# Patient Record
Sex: FEMALE | Race: WHITE | NOT HISPANIC OR LATINO
[De-identification: names, ages, dates, MRNs, and addresses within clinical notes are randomized per-mention and may not be internally consistent; named-entity substitution may affect disease eponyms.]

---

## 2017-08-02 ENCOUNTER — APPOINTMENT (OUTPATIENT)
Dept: DERMATOLOGY | Facility: CLINIC | Age: 77
End: 2017-08-02
Payer: MEDICARE

## 2017-08-02 DIAGNOSIS — Z41.1 ENCOUNTER FOR COSMETIC SURGERY: ICD-10-CM

## 2017-08-02 DIAGNOSIS — H26.9 UNSPECIFIED CATARACT: ICD-10-CM

## 2017-08-02 PROCEDURE — 99213 OFFICE O/P EST LOW 20 MIN: CPT

## 2017-08-02 PROCEDURE — D0125: CPT

## 2018-02-07 ENCOUNTER — APPOINTMENT (OUTPATIENT)
Dept: DERMATOLOGY | Facility: CLINIC | Age: 78
End: 2018-02-07
Payer: MEDICARE

## 2018-02-07 PROCEDURE — 99213 OFFICE O/P EST LOW 20 MIN: CPT

## 2018-05-02 ENCOUNTER — OUTPATIENT (OUTPATIENT)
Dept: OUTPATIENT SERVICES | Facility: HOSPITAL | Age: 78
LOS: 1 days | End: 2018-05-02
Payer: MEDICARE

## 2018-05-02 DIAGNOSIS — H26.492 OTHER SECONDARY CATARACT, LEFT EYE: ICD-10-CM

## 2018-05-02 DIAGNOSIS — Z86.69 PERSONAL HISTORY OF OTHER DISEASES OF THE NERVOUS SYSTEM AND SENSE ORGANS: Chronic | ICD-10-CM

## 2018-05-02 PROCEDURE — 66821 AFTER CATARACT LASER SURGERY: CPT | Mod: LT

## 2018-08-13 ENCOUNTER — APPOINTMENT (OUTPATIENT)
Dept: DERMATOLOGY | Facility: CLINIC | Age: 78
End: 2018-08-13
Payer: MEDICARE

## 2018-08-13 DIAGNOSIS — Z87.09 PERSONAL HISTORY OF OTHER DISEASES OF THE RESPIRATORY SYSTEM: ICD-10-CM

## 2018-08-13 PROCEDURE — 17000 DESTRUCT PREMALG LESION: CPT

## 2018-08-13 PROCEDURE — 99213 OFFICE O/P EST LOW 20 MIN: CPT | Mod: 25

## 2018-08-13 RX ORDER — AMOXICILLIN 875 MG/1
875 TABLET, FILM COATED ORAL
Qty: 20 | Refills: 0 | Status: DISCONTINUED | COMMUNITY
Start: 2018-05-21

## 2018-08-13 RX ORDER — MONTELUKAST 10 MG/1
10 TABLET, FILM COATED ORAL
Qty: 90 | Refills: 0 | Status: COMPLETED | COMMUNITY
Start: 2018-06-28

## 2018-08-13 RX ORDER — FLUTICASONE PROPIONATE 50 UG/1
50 SPRAY, METERED NASAL
Qty: 16 | Refills: 0 | Status: COMPLETED | COMMUNITY
Start: 2018-07-26

## 2018-08-13 RX ORDER — AZELASTINE HYDROCHLORIDE 137 UG/1
0.1 SPRAY, METERED NASAL
Qty: 30 | Refills: 0 | Status: COMPLETED | COMMUNITY
Start: 2018-07-26

## 2018-08-13 RX ORDER — CEFADROXIL 500 MG/1
500 CAPSULE ORAL
Qty: 20 | Refills: 0 | Status: COMPLETED | COMMUNITY
Start: 2018-05-03

## 2019-02-06 ENCOUNTER — APPOINTMENT (OUTPATIENT)
Dept: DERMATOLOGY | Facility: CLINIC | Age: 79
End: 2019-02-06
Payer: MEDICARE

## 2019-02-06 VITALS — WEIGHT: 200 LBS | HEIGHT: 68 IN | BODY MASS INDEX: 30.31 KG/M2

## 2019-02-06 PROCEDURE — 99213 OFFICE O/P EST LOW 20 MIN: CPT

## 2019-02-06 NOTE — HISTORY OF PRESENT ILLNESS
[de-identified] : Pt. presents for skin check;\par arms did well with 5FU\par Severity:  mild  \par Modifying factors:  none\par Associated symptoms:  none\par Context:  no association with activity

## 2019-02-06 NOTE — PHYSICAL EXAM
[Full Body Skin Exam Performed] : performed [FreeTextEntry3] : Skin examination performed of the face, neck, trunk, arms, legs; \par The patient is well, alert and oriented, pleasant and cooperative.\par Eyelids, conjunctivae, oral mucosa, digits and nails all normal.  \par No cervical adenopathy.\par \par Normal findings include:\par \par Seborrheic keratoses\par Angiomas\par Lentigines\par scaling erythematous papules; one prominent lesion L preauricular;  \par + DSAP; UEs, LEs; \par \par No lesions were suspicious for malignancy. \par \par

## 2019-02-06 NOTE — ASSESSMENT
[FreeTextEntry1] : Complete skin examination is negative for malignancy;\par Renew 5FU to spot tx for DSAP, may treat on ongoing basis;  arms did well\par Continue regular exams;

## 2019-06-03 ENCOUNTER — OUTPATIENT (OUTPATIENT)
Dept: OUTPATIENT SERVICES | Facility: HOSPITAL | Age: 79
LOS: 1 days | End: 2019-06-03
Payer: MEDICARE

## 2019-06-03 DIAGNOSIS — H40.1112 PRIMARY OPEN-ANGLE GLAUCOMA, RIGHT EYE, MODERATE STAGE: ICD-10-CM

## 2019-06-03 DIAGNOSIS — Z86.69 PERSONAL HISTORY OF OTHER DISEASES OF THE NERVOUS SYSTEM AND SENSE ORGANS: Chronic | ICD-10-CM

## 2019-06-03 PROCEDURE — 65855 TRABECULOPLASTY LASER SURG: CPT | Mod: RT

## 2019-06-26 ENCOUNTER — OUTPATIENT (OUTPATIENT)
Dept: OUTPATIENT SERVICES | Facility: HOSPITAL | Age: 79
LOS: 1 days | End: 2019-06-26
Payer: MEDICARE

## 2019-06-26 DIAGNOSIS — Z86.69 PERSONAL HISTORY OF OTHER DISEASES OF THE NERVOUS SYSTEM AND SENSE ORGANS: Chronic | ICD-10-CM

## 2019-06-26 DIAGNOSIS — H40.1190 PRIMARY OPEN-ANGLE GLAUCOMA, UNSPECIFIED EYE, STAGE UNSPECIFIED: ICD-10-CM

## 2019-06-26 PROCEDURE — 65855 TRABECULOPLASTY LASER SURG: CPT | Mod: LT

## 2019-08-07 ENCOUNTER — APPOINTMENT (OUTPATIENT)
Dept: DERMATOLOGY | Facility: CLINIC | Age: 79
End: 2019-08-07
Payer: MEDICARE

## 2019-08-07 DIAGNOSIS — Z85.828 PERSONAL HISTORY OF OTHER MALIGNANT NEOPLASM OF SKIN: ICD-10-CM

## 2019-08-07 PROCEDURE — 99213 OFFICE O/P EST LOW 20 MIN: CPT | Mod: 25

## 2019-08-07 PROCEDURE — 11103 TANGNTL BX SKIN EA SEP/ADDL: CPT

## 2019-08-07 PROCEDURE — 11102 TANGNTL BX SKIN SINGLE LES: CPT

## 2019-08-07 NOTE — ASSESSMENT
[FreeTextEntry1] : The patient was instructed to check portal and/or call the office in one week for biopsy results. \par Plan to treat by D&C if the biopsy is positive. \par Continue regular exams;

## 2019-08-07 NOTE — HISTORY OF PRESENT ILLNESS
[de-identified] : Pt. presents for skin check;\par one new spot on R side of nose\par Severity:  mild  \par Modifying factors:  none\par Associated symptoms:  none\par Context:  no association with activity

## 2019-08-07 NOTE — PHYSICAL EXAM
[Full Body Skin Exam Performed] : performed [FreeTextEntry3] : Skin examination performed of the face, neck, trunk, arms, legs; \par The patient is well, alert and oriented, pleasant and cooperative.\par Eyelids, conjunctivae, oral mucosa, digits and nails all normal.  \par No cervical adenopathy.\par \par Normal findings include:\par \par Seborrheic keratoses\par Angiomas\par Lentigines\par + DSAP; UEs, LEs; \par \par pearly papule with telangiectasiae - R nose ala; small;  \par also; slightly larger papule, L ala groove\par \par

## 2019-08-16 LAB — CORE LAB BIOPSY: NORMAL

## 2019-08-20 ENCOUNTER — APPOINTMENT (OUTPATIENT)
Dept: DERMATOLOGY | Facility: CLINIC | Age: 79
End: 2019-08-20
Payer: MEDICARE

## 2019-08-20 ENCOUNTER — APPOINTMENT (OUTPATIENT)
Dept: DERMATOLOGY | Facility: CLINIC | Age: 79
End: 2019-08-20

## 2019-08-20 PROCEDURE — 17282 DSTR MAL LS F/E/E/N/L/M1.1-2: CPT

## 2020-02-05 ENCOUNTER — APPOINTMENT (OUTPATIENT)
Dept: DERMATOLOGY | Facility: CLINIC | Age: 80
End: 2020-02-05
Payer: MEDICARE

## 2020-02-05 PROCEDURE — 11102 TANGNTL BX SKIN SINGLE LES: CPT

## 2020-02-05 PROCEDURE — 99213 OFFICE O/P EST LOW 20 MIN: CPT | Mod: 25

## 2020-02-05 NOTE — HISTORY OF PRESENT ILLNESS
[de-identified] : Pt. presents for skin check;\par recent BCC L nose; \par Severity:  mild  \par Modifying factors:  none\par Associated symptoms:  none\par Context:  no association with activity

## 2020-02-05 NOTE — ASSESSMENT
[FreeTextEntry1] : The patient was instructed to check portal and/or call the office in one week for biopsy results. \par Plan to treat by D&C if the biopsy is positive. r/o BCC near L earlobe\par Continue regular exams; \par

## 2020-02-05 NOTE — PHYSICAL EXAM
[Full Body Skin Exam Performed] : performed [FreeTextEntry3] : Skin examination performed of the face, neck, trunk, arms, legs; \par The patient is well, alert and oriented, pleasant and cooperative.\par Eyelids, conjunctivae, oral mucosa, digits and nails all normal.  \par No cervical adenopathy.\par \par Normal findings include:\par \par Seborrheic keratoses\par Angiomas\par Lentigines\par + DSAP; UEs, LEs; \par \par healed scar;  L ala groove\par \par pearly papule with telangiectasiae Left preauricular near earlobe;\par \par

## 2020-02-25 LAB — CORE LAB BIOPSY: NORMAL

## 2020-03-10 ENCOUNTER — APPOINTMENT (OUTPATIENT)
Dept: DERMATOLOGY | Facility: CLINIC | Age: 80
End: 2020-03-10
Payer: MEDICARE

## 2020-03-10 VITALS — HEIGHT: 68 IN | WEIGHT: 220 LBS | BODY MASS INDEX: 33.34 KG/M2

## 2020-03-10 PROCEDURE — 17282 DSTR MAL LS F/E/E/N/L/M1.1-2: CPT

## 2020-03-10 RX ORDER — LOVASTATIN 10 MG/1
10 TABLET ORAL
Refills: 0 | Status: DISCONTINUED | COMMUNITY
End: 2020-03-10

## 2020-08-05 ENCOUNTER — APPOINTMENT (OUTPATIENT)
Dept: DERMATOLOGY | Facility: CLINIC | Age: 80
End: 2020-08-05
Payer: MEDICARE

## 2020-08-05 VITALS — WEIGHT: 200 LBS | BODY MASS INDEX: 30.41 KG/M2

## 2020-08-05 PROCEDURE — 17000 DESTRUCT PREMALG LESION: CPT

## 2020-08-05 PROCEDURE — 99213 OFFICE O/P EST LOW 20 MIN: CPT | Mod: 25

## 2020-08-05 NOTE — HISTORY OF PRESENT ILLNESS
[de-identified] : Pt. presents for skin check;\par One recent inflamed lesion on R hand\par Severity:  mild  \par Modifying factors:  none\par Associated symptoms:  none\par Context:  no association with activity \par Recent BCCs on L nose, L preauricular

## 2020-08-05 NOTE — PHYSICAL EXAM
[Full Body Skin Exam Performed] : performed [FreeTextEntry3] : Skin examination performed of the face, neck, trunk, arms, legs; \par The patient is well, alert and oriented, pleasant and cooperative.\par Eyelids, conjunctivae, oral mucosa, digits and nails all normal.  \par No cervical adenopathy.\par \par Normal findings include:\par \par Seborrheic keratoses\par Angiomas\par Lentigines\par + DSAP; UEs, LEs; \par \par healed scar;  L ala groove, Left preauricular near earlobe;\par \par scaling erythematous papule; Right dorsal hand\par \par

## 2020-08-05 NOTE — ASSESSMENT
[FreeTextEntry1] : LN2 to AK R hand; \par recent BCCs healed well; \par Continue regular exams;  Follow up for TBSE in 6 months \par

## 2020-09-11 ENCOUNTER — APPOINTMENT (OUTPATIENT)
Dept: DERMATOLOGY | Facility: CLINIC | Age: 80
End: 2020-09-11
Payer: MEDICARE

## 2020-09-11 PROCEDURE — 99213 OFFICE O/P EST LOW 20 MIN: CPT | Mod: 25

## 2020-09-11 PROCEDURE — 11102 TANGNTL BX SKIN SINGLE LES: CPT

## 2020-09-11 PROCEDURE — 11103 TANGNTL BX SKIN EA SEP/ADDL: CPT

## 2020-09-11 NOTE — PHYSICAL EXAM
[FreeTextEntry3] : Skin examination performed of the face, neck, chest, hands, lower legs;\par The patient is well, alert and oriented, pleasant and cooperative.\par Eyelids, conjunctivae, oral mucosa, digits and nails all normal.  \par No cervical adenopathy.\par \par \par + lentigines and solar damage are present in sun exposed areas; \par \par pearly papule with telangiectasiae - inflamed;  L chin; \par similar, smaller lesion R paranasal

## 2020-09-11 NOTE — ASSESSMENT
[FreeTextEntry1] : The patient was instructed to check portal and/or call the office in one week for biopsy results.\par Plan to treat by D&C if the biopsy is positive. r/o BCC vs. inflammatory L chin, R paranasal\par \par also:  renew 5FU for DSAP on arms

## 2020-09-11 NOTE — HISTORY OF PRESENT ILLNESS
[de-identified] : The patient has been fit in for urgent appointment. \par c/o lesion on L chin;  grew, changed color over last few weeks; \par also; ? growing lesion near R nose

## 2020-09-30 LAB — CORE LAB BIOPSY: NORMAL

## 2020-10-13 ENCOUNTER — APPOINTMENT (OUTPATIENT)
Dept: DERMATOLOGY | Facility: CLINIC | Age: 80
End: 2020-10-13
Payer: MEDICARE

## 2020-10-13 VITALS — WEIGHT: 200 LBS | BODY MASS INDEX: 30.31 KG/M2 | HEIGHT: 68 IN

## 2020-10-13 PROCEDURE — 17282 DSTR MAL LS F/E/E/N/L/M1.1-2: CPT | Mod: 59

## 2020-12-02 ENCOUNTER — APPOINTMENT (OUTPATIENT)
Dept: DERMATOLOGY | Facility: CLINIC | Age: 80
End: 2020-12-02
Payer: MEDICARE

## 2020-12-02 PROCEDURE — 99213 OFFICE O/P EST LOW 20 MIN: CPT | Mod: 25

## 2020-12-02 PROCEDURE — 11102 TANGNTL BX SKIN SINGLE LES: CPT

## 2020-12-02 NOTE — ASSESSMENT
[FreeTextEntry1] : r/o SCC\par The patient was instructed to check portal and/or call the office in one week for biopsy results. \par Plan to treat by D&C if the biopsy is positive. \par \par

## 2020-12-02 NOTE — HISTORY OF PRESENT ILLNESS
[de-identified] : The patient has been fit in for urgent appointment.\par c/o irritated, inflamed, bleeding lesion on R lower leg;  \par noticed last few days;  no txs;

## 2020-12-11 LAB — CORE LAB BIOPSY: NORMAL

## 2021-02-03 ENCOUNTER — APPOINTMENT (OUTPATIENT)
Dept: DERMATOLOGY | Facility: CLINIC | Age: 81
End: 2021-02-03
Payer: MEDICARE

## 2021-02-03 PROCEDURE — 11102 TANGNTL BX SKIN SINGLE LES: CPT

## 2021-02-03 PROCEDURE — 99213 OFFICE O/P EST LOW 20 MIN: CPT | Mod: 25

## 2021-02-03 NOTE — ASSESSMENT
[FreeTextEntry1] : The patient was instructed to check portal and/or call the office in one week for biopsy results.\par Plan to refer for Mohs surgery if the biopsy is positive. Lesion near prior surgical site\par \par Continue regular exams;  Follow up for TBSE in 6 months \par also:  fluticasone ointment prn for intertrigo;  vaseline maintenance

## 2021-02-03 NOTE — PHYSICAL EXAM
[Full Body Skin Exam Performed] : performed [FreeTextEntry3] : Skin examination performed of the face, neck, trunk, arms, legs; \par The patient is well, alert and oriented, pleasant and cooperative.\par Eyelids, conjunctivae, oral mucosa, digits and nails all normal.  \par No cervical adenopathy.\par \par Normal findings include:\par \par Seborrheic keratoses\par Angiomas\par Lentigines\par + DSAP; UEs, LEs; \par \par healed scar; L chin, R paranasal\par \par pearly papule with telangiectasiae L paranasal near prior Mohs scar\par healed Bx site R shin\par \par

## 2021-02-03 NOTE — HISTORY OF PRESENT ILLNESS
[de-identified] : Pt. presents for skin check;\par One recent biopsy r lower leg\par Severity:  mild  \par Modifying factors:  none\par Associated symptoms:  none\par Context:  no association with activity \par Recent BCCs on R chin, R paranasal

## 2021-02-19 LAB — CORE LAB BIOPSY: NORMAL

## 2021-02-24 NOTE — PHYSICAL EXAM
[FreeTextEntry3] : R lower leg;  eroded inflamed papules;  amongst background of widespread DSAP\par 
denies

## 2021-03-01 ENCOUNTER — APPOINTMENT (OUTPATIENT)
Dept: DISASTER EMERGENCY | Facility: CLINIC | Age: 81
End: 2021-03-01

## 2021-03-01 DIAGNOSIS — Z01.818 ENCOUNTER FOR OTHER PREPROCEDURAL EXAMINATION: ICD-10-CM

## 2021-03-02 LAB — SARS-COV-2 N GENE NPH QL NAA+PROBE: NOT DETECTED

## 2021-03-04 ENCOUNTER — NON-APPOINTMENT (OUTPATIENT)
Age: 81
End: 2021-03-04

## 2021-03-04 ENCOUNTER — APPOINTMENT (OUTPATIENT)
Dept: DERMATOLOGY | Facility: CLINIC | Age: 81
End: 2021-03-04
Payer: MEDICARE

## 2021-03-04 PROCEDURE — 13132 CMPLX RPR F/C/C/M/N/AX/G/H/F: CPT

## 2021-03-04 PROCEDURE — 17311 MOHS 1 STAGE H/N/HF/G: CPT

## 2021-03-04 PROCEDURE — 17312 MOHS ADDL STAGE: CPT

## 2021-06-28 ENCOUNTER — RX RENEWAL (OUTPATIENT)
Age: 81
End: 2021-06-28

## 2021-07-22 PROBLEM — Z72.89 ALCOHOL USE: Status: ACTIVE | Noted: 2021-07-22

## 2021-07-22 PROBLEM — Z80.6 FAMILY HISTORY OF CHRONIC MYELOID LEUKEMIA: Status: ACTIVE | Noted: 2021-07-22

## 2021-07-22 PROBLEM — R55 VASOVAGAL SYNCOPE: Status: RESOLVED | Noted: 2021-07-22 | Resolved: 2021-07-22

## 2021-07-22 PROBLEM — Z80.8 FAMILY HISTORY OF MALIGNANT MELANOMA: Status: ACTIVE | Noted: 2021-07-22

## 2021-07-22 PROBLEM — Z82.49 FAMILY HISTORY OF CORONARY ARTERY DISEASE: Status: ACTIVE | Noted: 2021-07-22

## 2021-07-22 PROBLEM — Z82.49 FAMILY HISTORY OF MYOCARDIAL INFARCTION: Status: ACTIVE | Noted: 2021-07-22

## 2021-07-22 PROBLEM — Z82.49 FAMILY HISTORY OF CONGESTIVE HEART FAILURE: Status: ACTIVE | Noted: 2021-07-22

## 2021-07-22 PROBLEM — Z82.49 FAMILY HISTORY OF HYPERTENSION: Status: ACTIVE | Noted: 2021-07-22

## 2021-07-22 PROBLEM — Z78.9 EXERCISES OCCASIONALLY: Status: ACTIVE | Noted: 2021-07-22

## 2021-07-22 PROBLEM — Z63.4 WIDOWED: Status: ACTIVE | Noted: 2021-07-22

## 2021-07-22 RX ORDER — ATENOLOL 100 MG/1
100 TABLET ORAL
Refills: 0 | Status: DISCONTINUED | COMMUNITY
End: 2021-07-22

## 2021-07-23 ENCOUNTER — MED ADMIN CHARGE (OUTPATIENT)
Age: 81
End: 2021-07-23

## 2021-07-23 ENCOUNTER — APPOINTMENT (OUTPATIENT)
Dept: FAMILY MEDICINE | Facility: CLINIC | Age: 81
End: 2021-07-23
Payer: MEDICARE

## 2021-07-23 VITALS
WEIGHT: 225 LBS | BODY MASS INDEX: 34.1 KG/M2 | OXYGEN SATURATION: 98 % | HEART RATE: 50 BPM | DIASTOLIC BLOOD PRESSURE: 88 MMHG | HEIGHT: 68 IN | TEMPERATURE: 97 F | SYSTOLIC BLOOD PRESSURE: 130 MMHG

## 2021-07-23 DIAGNOSIS — Z80.8 FAMILY HISTORY OF MALIGNANT NEOPLASM OF OTHER ORGANS OR SYSTEMS: ICD-10-CM

## 2021-07-23 DIAGNOSIS — Z23 ENCOUNTER FOR IMMUNIZATION: ICD-10-CM

## 2021-07-23 DIAGNOSIS — Z82.49 FAMILY HISTORY OF ISCHEMIC HEART DISEASE AND OTHER DISEASES OF THE CIRCULATORY SYSTEM: ICD-10-CM

## 2021-07-23 DIAGNOSIS — R55 SYNCOPE AND COLLAPSE: ICD-10-CM

## 2021-07-23 DIAGNOSIS — Z78.9 OTHER SPECIFIED HEALTH STATUS: ICD-10-CM

## 2021-07-23 DIAGNOSIS — Z72.89 OTHER PROBLEMS RELATED TO LIFESTYLE: ICD-10-CM

## 2021-07-23 DIAGNOSIS — Z80.6 FAMILY HISTORY OF LEUKEMIA: ICD-10-CM

## 2021-07-23 DIAGNOSIS — Z63.4 DISAPPEARANCE AND DEATH OF FAMILY MEMBER: ICD-10-CM

## 2021-07-23 PROCEDURE — 99214 OFFICE O/P EST MOD 30 MIN: CPT | Mod: 25

## 2021-07-23 PROCEDURE — 90732 PPSV23 VACC 2 YRS+ SUBQ/IM: CPT

## 2021-07-23 PROCEDURE — G0009: CPT

## 2021-07-23 PROCEDURE — 36415 COLL VENOUS BLD VENIPUNCTURE: CPT

## 2021-07-23 SDOH — SOCIAL STABILITY - SOCIAL INSECURITY: DISSAPEARANCE AND DEATH OF FAMILY MEMBER: Z63.4

## 2021-07-23 NOTE — PLAN
[FreeTextEntry1] : Discussed clean eating (eg Mediterranean style eating plan) and regular exercise/staying as physically active as possible. Revd concepts of motion is lotion and move more, sit less. \par \par Reviewed importance of good self care (eg meditation, yoga, adequate rest, regular exercise, magnesium, clean eating etc) to help with anxiety/stress and also to optimize BPs. Revd r/b/se Alprazolam and that it is not recommended generally at this stage of life but as med is used infreq and still well tolerated and effective for her ok for her to cont to keep on hand for now. She will d/c med if at any point she has any SE and she will cont to use only sparingly. \par \par Check labs today (partially fasting as had cheerios and banana and milk). Cont same meds/doses pending doses. \par \par Pneumovax revd/recommended. Given today to complete pneumococcal series. \par \par Her BP is mildly elevated above goal here today and she notes that she has had feelings of elevated BP at home recently (feeling agitated/anxious etc). Disc option to increase or add BP med and she is willing to do so. Given that her HR is already around 50 on current dose of Atenolol we really can not safely increase that med. Thus will Add amlodipine 2.5 mg daily. She can monitor BPs at home with goal of <=130/80 on avg and if she finds BPs are symptomatically low she can skip/hold/d/c amlodipine and if home BPs are still above goal with the 2.5 mg dose of amlodipine she can let me know and we can incr dose. \par \par Last PE was 1/2020 so will sched next visit as a PE visit.

## 2021-07-23 NOTE — HISTORY OF PRESENT ILLNESS
[FreeTextEntry1] : ALONDRA LOUIS is a 81 year old female here for a follow up visit.\par  [de-identified] : Here for f/u of hypercholesterolemia and hypertension and impaired fasting glucose and OA. \par \ирина Takes meds consistently and is tolerating them well. She has been taking folic acid for years on recommendation of her prior PMD (Dr. Lemon) but it is not clear why she is taking this. We have revd that it is not clear that this is a med she needs to continue long term. Her old records do not clearly indicate reason for use of folic acid. Revd pros/cons of folic acid supplementation at this stage of life. She prefers to cont folic acid for now as is well tolerated\par \par Joint pain and stiffness is reasonably well controlled with keeping as physically active as possible. \par \par She has occasional situational anxiety mainly surrounding certain situations that remind of her of the time when she had syncopal episode (eg crowded busy places of travel) and when this occurs she takes 1/2 of a 0.25 mg Alprazolam with good effect and med is well tolerated so she likes to keep it on hand for prn use. \par \par She has had COVID vaccine series. She is due for Pneumovax and willing to get this today.\par \[facility]. She is eating well. \par \par She has had some episodes where felt some anxiety/agitation and wonders if BP was perhaps a bit elevated. She has not been checking BPs at home. Had one episode on very hot day where she felt as if she might faint but once she moved to an air conditioned room she felt better and she did not pass out at all. She does not feel she needs to have repeat card eval at this point as generally is feeling well.

## 2021-07-23 NOTE — PHYSICAL EXAM
[No Acute Distress] : no acute distress [Well Developed] : well developed [Well-Appearing] : well-appearing [Normal Sclera/Conjunctiva] : normal sclera/conjunctiva [EOMI] : extraocular movements intact [No JVD] : no jugular venous distention [No Lymphadenopathy] : no lymphadenopathy [Supple] : supple [Thyroid Normal, No Nodules] : the thyroid was normal and there were no nodules present [No Respiratory Distress] : no respiratory distress  [No Accessory Muscle Use] : no accessory muscle use [Clear to Auscultation] : lungs were clear to auscultation bilaterally [Regular Rhythm] : with a regular rhythm [Normal S1, S2] : normal S1 and S2 [No Carotid Bruits] : no carotid bruits [No Varicosities] : no varicosities [Pedal Pulses Present] : the pedal pulses are present [No Edema] : there was no peripheral edema [No Extremity Clubbing/Cyanosis] : no extremity clubbing/cyanosis [Soft] : abdomen soft [Non Tender] : non-tender [Non-distended] : non-distended [No Masses] : no abdominal mass palpated [No HSM] : no HSM [Normal Bowel Sounds] : normal bowel sounds [No Joint Swelling] : no joint swelling [Grossly Normal Strength/Tone] : grossly normal strength/tone [Coordination Grossly Intact] : coordination grossly intact [No Rash] : no rash [No Focal Deficits] : no focal deficits [Normal Gait] : normal gait [Normal Affect] : the affect was normal [Normal Insight/Judgement] : insight and judgment were intact [de-identified] : mildly obese [de-identified] : 1/6 soft systolic murmur heard along LSB, no ectopy heard, bradycardic rate [de-identified] : +sclerotic joints B hands/wrists/knees etc but no s/sxs acute synovitis [de-identified] : +signif UV damage noted to skin

## 2021-07-23 NOTE — REVIEW OF SYSTEMS
[Joint Pain] : joint pain [Joint Stiffness] : joint stiffness [Anxiety] : anxiety [Negative] : Heme/Lymph [Fever] : no fever [Chills] : no chills [Fatigue] : no fatigue [Recent Change In Weight] : ~T no recent weight change [Chest Pain] : no chest pain [Palpitations] : no palpitations [Lower Ext Edema] : no lower extremity edema [Shortness Of Breath] : no shortness of breath [Wheezing] : no wheezing [Cough] : no cough [Dyspnea on Exertion] : no dyspnea on exertion [Abdominal Pain] : no abdominal pain [Diarrhea] : diarrhea [Vomiting] : no vomiting [Skin Rash] : no skin rash [Headache] : no headache [Dizziness] : no dizziness [Fainting] : no fainting [Depression] : no depression [FreeTextEntry5] : one episode of feeling briefly lightheaded like she might pass out in hot envt that resolved once she moved to [place] env[place] and no sxs like this since and did not have syncope, see HPI also  [de-identified] : h/o multiple skin cancers (non melanoma) and she is followed closely by derm with regular skin checks [de-identified] : occas situational anxiety

## 2021-07-24 LAB
ALBUMIN SERPL ELPH-MCNC: 4.1 G/DL
ALP BLD-CCNC: 84 U/L
ALT SERPL-CCNC: 10 U/L
ANION GAP SERPL CALC-SCNC: 13 MMOL/L
AST SERPL-CCNC: 17 U/L
BILIRUB SERPL-MCNC: 0.3 MG/DL
BUN SERPL-MCNC: 16 MG/DL
CALCIUM SERPL-MCNC: 9.4 MG/DL
CHLORIDE SERPL-SCNC: 106 MMOL/L
CHOLEST SERPL-MCNC: 145 MG/DL
CO2 SERPL-SCNC: 24 MMOL/L
CREAT SERPL-MCNC: 0.93 MG/DL
ESTIMATED AVERAGE GLUCOSE: 114 MG/DL
GLUCOSE SERPL-MCNC: 93 MG/DL
HBA1C MFR BLD HPLC: 5.6 %
HDLC SERPL-MCNC: 56 MG/DL
LDLC SERPL CALC-MCNC: 67 MG/DL
NONHDLC SERPL-MCNC: 89 MG/DL
POTASSIUM SERPL-SCNC: 4.9 MMOL/L
PROT SERPL-MCNC: 6.7 G/DL
SODIUM SERPL-SCNC: 143 MMOL/L
TRIGL SERPL-MCNC: 109 MG/DL

## 2021-07-28 ENCOUNTER — NON-APPOINTMENT (OUTPATIENT)
Age: 81
End: 2021-07-28

## 2021-08-11 ENCOUNTER — APPOINTMENT (OUTPATIENT)
Dept: DERMATOLOGY | Facility: CLINIC | Age: 81
End: 2021-08-11
Payer: MEDICARE

## 2021-08-11 PROCEDURE — 99213 OFFICE O/P EST LOW 20 MIN: CPT

## 2021-08-11 NOTE — ASSESSMENT
[FreeTextEntry1] : Complete skin examination is negative for malignancy; Multiple new concerns were addressed and discussed.\par Therapeutic options and their risks and benefits; along with multiple diagnostic possibilities were discussed at length;\par risks and benefits of skin biopsy and/or other further study were discussed;\par \par Recent Mohs L nose healed very well\par \par Continue regular exams;  Follow up for TBSE in 6 months \par also:  fluticasone ointment prn for intertrigo;  vaseline maintenance

## 2021-08-11 NOTE — PHYSICAL EXAM
[Full Body Skin Exam Performed] : performed [FreeTextEntry3] : Skin examination performed of the face, neck, trunk, arms, legs; \par The patient is well, alert and oriented, pleasant and cooperative.\par Eyelids, conjunctivae, oral mucosa, digits and nails all normal.  \par No cervical adenopathy.\par \par Normal findings include:\par \par Seborrheic keratoses\par Angiomas\par Lentigines\par + DSAP; UEs, LEs; \par \par healed scar; L chin, R paranasal\par \par Healed flap scar L paranasal \par \par No lesions suspicious for malignancy. \par

## 2021-08-11 NOTE — HISTORY OF PRESENT ILLNESS
[de-identified] : Pt. presents for skin check;\par c/o few spots of concern;  recent Mohs L nose; \par Severity:  mild  \par Modifying factors:  none\par Associated symptoms:  none\par Context:  no association with activity\par \par Recent BCCs on R chin, R paranasal

## 2021-09-30 ENCOUNTER — NON-APPOINTMENT (OUTPATIENT)
Age: 81
End: 2021-09-30

## 2021-09-30 ENCOUNTER — APPOINTMENT (OUTPATIENT)
Dept: ORTHOPEDIC SURGERY | Facility: CLINIC | Age: 81
End: 2021-09-30
Payer: MEDICARE

## 2021-09-30 DIAGNOSIS — M17.12 UNILATERAL PRIMARY OSTEOARTHRITIS, LEFT KNEE: ICD-10-CM

## 2021-09-30 PROCEDURE — 99204 OFFICE O/P NEW MOD 45 MIN: CPT

## 2021-09-30 PROCEDURE — 73562 X-RAY EXAM OF KNEE 3: CPT | Mod: LT

## 2021-09-30 NOTE — PHYSICAL EXAM
[de-identified] : General: Alert and oriented x3. In no acute distress. Pleasant in nature with a normal affect. No apparent respiratory distress.\par \par Left Knee Exam\par Skin: Clean, dry, intact\par Inspection: No obvious malalignment, no masses, no swelling, no effusion\par Pulses: 2+ DP/PT pulses\par ROM: 5-125 degrees of flexion. No pain with deep knee flexion.\par Tenderness: No MJLT. No LJLT. No pain over the patella facets. No pain to the quadriceps mechanism.\par Stability: Stable to varus, valgus, lachman testing. Negative anterior/posterior drawer.\par Strength: 5/5 Q/H/TA/GS/EHL, no atrophy\par Neuro: In tact to light touch throughout, DTR's normal\par Additional tests: Negative McMurrays test, Negative patellar grind test.		\par \par Left Foot Exam\par Skin: Clean, dry, intact\par Inspection: No obvious malalignment, no masses, no swelling, no effusion. Hallux valgus. Hammertoe to the 2nd toe. \par Pulses: 2+ DP/PT pulses\par ROM: FOOT Full  ROM of digits, ANKLE 10 degrees of dorsiflexion, 40 degrees of plantarflexion, 10 degrees of subtalar motion.\par Painful ROM: None\par Tenderness: No tenderness over the medial malleolus, No tenderness over the lateral malleolus, no CFL/ATFL/PTFL pain, no deltoid ligament pain. No heel pain. No Achilles tenderness. No 5th metatarsal pain. No pain to the LisFranc joint. No ttp over the posterior tibial tendon.\par Stability: Negative anterior/posterior drawer.\par Strength: 5/5 ADD/ABD/TA/GS/EHL/FHL/EDL\par Neuro: Sensation in tact to light touch throughout\par Additional tests: Negative Mortons test, negative tarsal tunnel tinels, negative single heel rise.	 [de-identified] : 3V of the left knee were ordered, obtained, and reviewed by me today, 09/30/2021, revealed: Osteoarthritis. No acute fractures. \par

## 2021-09-30 NOTE — HISTORY OF PRESENT ILLNESS
[de-identified] : 9/30/2021: ALONDRA LOUIS is a 81 year old female presenting for an initial evaluation of left knee pain. The patient’s pain is noted to be a 5/10. The patient states that she injured her knee on 9/25/2021 due to a fall in a twisting injury. After the injury, the patient notes that she went to Urgent Care for this issue, who then referred her to this clinic. The patient denies any mechanical symptoms. She also c/o left foot pain, potentially attributing her pain to the injury. ALONDRA also denies any numbness or tingling sensations. She presents weightbearing in sneakers. No other complaints.

## 2021-09-30 NOTE — ADDENDUM
[FreeTextEntry1] : I, Saige Robbins, acted solely as a scribe for Dr. Ryne Gary on this date 09/30/2021.\par \par All medical record entries made by the Scribe were at my, Dr. Ryne Gary, direction and personally dictated by me on 09/30/2021 . I have reviewed the chart and agree that the record accurately reflects my personal performance of the history, physical exam, assessment and plan. I have also personally directed, reviewed, and agreed with the chart.	\par

## 2021-11-02 ENCOUNTER — INPATIENT (INPATIENT)
Facility: HOSPITAL | Age: 81
LOS: 1 days | Discharge: HOME | DRG: 309 | End: 2021-11-04
Attending: FAMILY MEDICINE | Admitting: FAMILY MEDICINE
Payer: MEDICARE

## 2021-11-02 VITALS
OXYGEN SATURATION: 98 % | HEIGHT: 68 IN | WEIGHT: 220.02 LBS | SYSTOLIC BLOOD PRESSURE: 199 MMHG | TEMPERATURE: 98 F | HEART RATE: 53 BPM | DIASTOLIC BLOOD PRESSURE: 103 MMHG | RESPIRATION RATE: 19 BRPM

## 2021-11-02 DIAGNOSIS — R00.1 BRADYCARDIA, UNSPECIFIED: ICD-10-CM

## 2021-11-02 DIAGNOSIS — Z86.69 PERSONAL HISTORY OF OTHER DISEASES OF THE NERVOUS SYSTEM AND SENSE ORGANS: Chronic | ICD-10-CM

## 2021-11-02 LAB
ALBUMIN SERPL ELPH-MCNC: 3.3 G/DL — SIGNIFICANT CHANGE UP (ref 3.3–5)
ALP SERPL-CCNC: 77 U/L — SIGNIFICANT CHANGE UP (ref 40–120)
ALT FLD-CCNC: 19 U/L — SIGNIFICANT CHANGE UP (ref 12–78)
ANION GAP SERPL CALC-SCNC: 7 MMOL/L — SIGNIFICANT CHANGE UP (ref 5–17)
APPEARANCE UR: CLEAR — SIGNIFICANT CHANGE UP
APTT BLD: 35.7 SEC — HIGH (ref 27.5–35.5)
AST SERPL-CCNC: 21 U/L — SIGNIFICANT CHANGE UP (ref 15–37)
BASOPHILS # BLD AUTO: 0.05 K/UL — SIGNIFICANT CHANGE UP (ref 0–0.2)
BASOPHILS NFR BLD AUTO: 0.6 % — SIGNIFICANT CHANGE UP (ref 0–2)
BILIRUB SERPL-MCNC: 0.5 MG/DL — SIGNIFICANT CHANGE UP (ref 0.2–1.2)
BILIRUB UR-MCNC: NEGATIVE — SIGNIFICANT CHANGE UP
BUN SERPL-MCNC: 17 MG/DL — SIGNIFICANT CHANGE UP (ref 7–23)
CALCIUM SERPL-MCNC: 8.7 MG/DL — SIGNIFICANT CHANGE UP (ref 8.5–10.1)
CHLORIDE SERPL-SCNC: 107 MMOL/L — SIGNIFICANT CHANGE UP (ref 96–108)
CK SERPL-CCNC: 57 U/L — SIGNIFICANT CHANGE UP (ref 26–192)
CO2 SERPL-SCNC: 29 MMOL/L — SIGNIFICANT CHANGE UP (ref 22–31)
COLOR SPEC: YELLOW — SIGNIFICANT CHANGE UP
CREAT SERPL-MCNC: 0.84 MG/DL — SIGNIFICANT CHANGE UP (ref 0.5–1.3)
DIFF PNL FLD: NEGATIVE — SIGNIFICANT CHANGE UP
EOSINOPHIL # BLD AUTO: 0.12 K/UL — SIGNIFICANT CHANGE UP (ref 0–0.5)
EOSINOPHIL NFR BLD AUTO: 1.3 % — SIGNIFICANT CHANGE UP (ref 0–6)
GLUCOSE SERPL-MCNC: 104 MG/DL — HIGH (ref 70–99)
GLUCOSE UR QL: NEGATIVE MG/DL — SIGNIFICANT CHANGE UP
HCT VFR BLD CALC: 40.9 % — SIGNIFICANT CHANGE UP (ref 34.5–45)
HGB BLD-MCNC: 13.1 G/DL — SIGNIFICANT CHANGE UP (ref 11.5–15.5)
IMM GRANULOCYTES NFR BLD AUTO: 0.3 % — SIGNIFICANT CHANGE UP (ref 0–1.5)
INR BLD: 1.09 RATIO — SIGNIFICANT CHANGE UP (ref 0.88–1.16)
KETONES UR-MCNC: NEGATIVE — SIGNIFICANT CHANGE UP
LEUKOCYTE ESTERASE UR-ACNC: ABNORMAL
LYMPHOCYTES # BLD AUTO: 2.68 K/UL — SIGNIFICANT CHANGE UP (ref 1–3.3)
LYMPHOCYTES # BLD AUTO: 29.6 % — SIGNIFICANT CHANGE UP (ref 13–44)
MAGNESIUM SERPL-MCNC: 2.3 MG/DL — SIGNIFICANT CHANGE UP (ref 1.6–2.6)
MCHC RBC-ENTMCNC: 31.3 PG — SIGNIFICANT CHANGE UP (ref 27–34)
MCHC RBC-ENTMCNC: 32 GM/DL — SIGNIFICANT CHANGE UP (ref 32–36)
MCV RBC AUTO: 97.6 FL — SIGNIFICANT CHANGE UP (ref 80–100)
MONOCYTES # BLD AUTO: 0.74 K/UL — SIGNIFICANT CHANGE UP (ref 0–0.9)
MONOCYTES NFR BLD AUTO: 8.2 % — SIGNIFICANT CHANGE UP (ref 2–14)
NEUTROPHILS # BLD AUTO: 5.43 K/UL — SIGNIFICANT CHANGE UP (ref 1.8–7.4)
NEUTROPHILS NFR BLD AUTO: 60 % — SIGNIFICANT CHANGE UP (ref 43–77)
NITRITE UR-MCNC: NEGATIVE — SIGNIFICANT CHANGE UP
PH UR: 6 — SIGNIFICANT CHANGE UP (ref 5–8)
PLATELET # BLD AUTO: 241 K/UL — SIGNIFICANT CHANGE UP (ref 150–400)
POTASSIUM SERPL-MCNC: 4.4 MMOL/L — SIGNIFICANT CHANGE UP (ref 3.5–5.3)
POTASSIUM SERPL-SCNC: 4.4 MMOL/L — SIGNIFICANT CHANGE UP (ref 3.5–5.3)
PROT SERPL-MCNC: 7.1 GM/DL — SIGNIFICANT CHANGE UP (ref 6–8.3)
PROT UR-MCNC: NEGATIVE MG/DL — SIGNIFICANT CHANGE UP
PROTHROM AB SERPL-ACNC: 12.7 SEC — SIGNIFICANT CHANGE UP (ref 10.6–13.6)
RBC # BLD: 4.19 M/UL — SIGNIFICANT CHANGE UP (ref 3.8–5.2)
RBC # FLD: 13.3 % — SIGNIFICANT CHANGE UP (ref 10.3–14.5)
SARS-COV-2 RNA SPEC QL NAA+PROBE: SIGNIFICANT CHANGE UP
SODIUM SERPL-SCNC: 143 MMOL/L — SIGNIFICANT CHANGE UP (ref 135–145)
SP GR SPEC: 1.01 — SIGNIFICANT CHANGE UP (ref 1.01–1.02)
TROPONIN I, HIGH SENSITIVITY RESULT: 46.22 NG/L — SIGNIFICANT CHANGE UP
TROPONIN I, HIGH SENSITIVITY RESULT: 46.78 NG/L — SIGNIFICANT CHANGE UP
UROBILINOGEN FLD QL: NEGATIVE MG/DL — SIGNIFICANT CHANGE UP
WBC # BLD: 9.05 K/UL — SIGNIFICANT CHANGE UP (ref 3.8–10.5)
WBC # FLD AUTO: 9.05 K/UL — SIGNIFICANT CHANGE UP (ref 3.8–10.5)

## 2021-11-02 PROCEDURE — 86769 SARS-COV-2 COVID-19 ANTIBODY: CPT

## 2021-11-02 PROCEDURE — 87186 SC STD MICRODIL/AGAR DIL: CPT

## 2021-11-02 PROCEDURE — 71045 X-RAY EXAM CHEST 1 VIEW: CPT | Mod: 26

## 2021-11-02 PROCEDURE — 36415 COLL VENOUS BLD VENIPUNCTURE: CPT

## 2021-11-02 PROCEDURE — 84443 ASSAY THYROID STIM HORMONE: CPT

## 2021-11-02 PROCEDURE — 99223 1ST HOSP IP/OBS HIGH 75: CPT

## 2021-11-02 PROCEDURE — 84484 ASSAY OF TROPONIN QUANT: CPT

## 2021-11-02 PROCEDURE — 81001 URINALYSIS AUTO W/SCOPE: CPT

## 2021-11-02 PROCEDURE — 99285 EMERGENCY DEPT VISIT HI MDM: CPT

## 2021-11-02 PROCEDURE — 87077 CULTURE AEROBIC IDENTIFY: CPT

## 2021-11-02 PROCEDURE — 70450 CT HEAD/BRAIN W/O DYE: CPT | Mod: 26,MG

## 2021-11-02 PROCEDURE — 85027 COMPLETE CBC AUTOMATED: CPT

## 2021-11-02 PROCEDURE — 87086 URINE CULTURE/COLONY COUNT: CPT

## 2021-11-02 PROCEDURE — G1004: CPT

## 2021-11-02 PROCEDURE — 80048 BASIC METABOLIC PNL TOTAL CA: CPT

## 2021-11-02 PROCEDURE — 93306 TTE W/DOPPLER COMPLETE: CPT

## 2021-11-02 RX ORDER — AMLODIPINE BESYLATE 2.5 MG/1
10 TABLET ORAL DAILY
Refills: 0 | Status: DISCONTINUED | OUTPATIENT
Start: 2021-11-02 | End: 2021-11-04

## 2021-11-02 RX ORDER — PREGABALIN 225 MG/1
1 CAPSULE ORAL
Qty: 0 | Refills: 0 | DISCHARGE

## 2021-11-02 RX ORDER — LOSARTAN POTASSIUM 100 MG/1
50 TABLET, FILM COATED ORAL DAILY
Refills: 0 | Status: DISCONTINUED | OUTPATIENT
Start: 2021-11-02 | End: 2021-11-04

## 2021-11-02 RX ORDER — ENOXAPARIN SODIUM 100 MG/ML
40 INJECTION SUBCUTANEOUS DAILY
Refills: 0 | Status: DISCONTINUED | OUTPATIENT
Start: 2021-11-02 | End: 2021-11-04

## 2021-11-02 RX ORDER — OMEGA-3 ACID ETHYL ESTERS 1 G
1 CAPSULE ORAL
Qty: 0 | Refills: 0 | DISCHARGE

## 2021-11-02 RX ORDER — LOVASTATIN 20 MG
1 TABLET ORAL
Qty: 0 | Refills: 0 | DISCHARGE

## 2021-11-02 RX ORDER — LUTEIN 20 MG
1 CAPSULE ORAL
Qty: 0 | Refills: 0 | DISCHARGE

## 2021-11-02 RX ORDER — ATORVASTATIN CALCIUM 80 MG/1
10 TABLET, FILM COATED ORAL AT BEDTIME
Refills: 0 | Status: DISCONTINUED | OUTPATIENT
Start: 2021-11-02 | End: 2021-11-04

## 2021-11-02 RX ORDER — CHOLECALCIFEROL (VITAMIN D3) 125 MCG
1 CAPSULE ORAL
Qty: 0 | Refills: 0 | DISCHARGE

## 2021-11-02 RX ADMIN — ATORVASTATIN CALCIUM 10 MILLIGRAM(S): 80 TABLET, FILM COATED ORAL at 21:25

## 2021-11-02 RX ADMIN — ENOXAPARIN SODIUM 40 MILLIGRAM(S): 100 INJECTION SUBCUTANEOUS at 18:44

## 2021-11-02 RX ADMIN — LOSARTAN POTASSIUM 50 MILLIGRAM(S): 100 TABLET, FILM COATED ORAL at 18:44

## 2021-11-02 RX ADMIN — AMLODIPINE BESYLATE 10 MILLIGRAM(S): 2.5 TABLET ORAL at 18:43

## 2021-11-02 NOTE — ED PROVIDER NOTE - OBJECTIVE STATEMENT
Patient is a 82 yo female with hx of HTN on atenolol and amlodipine; reports weeks of intermittent dizziness and lightheadedness; reports that she had syncopal episode last week while walking in [place]; no chest pain; no sob; no head injury; no weakness in arms or legs; patient went to urgent care center and heart rate was found to be in 40s and referred to ED for further evaluation and to see cardiology per patient.

## 2021-11-02 NOTE — ED ADULT TRIAGE NOTE - CHIEF COMPLAINT QUOTE
pt presents to ed via ems from Dr pretty office for bradycardia 40-50s. pt has hx of uncontrolled htn. pt reports she had syncopal episode on friday due to bradycardia and was treated and released and was following up with dr rendon. pt reports dizziness upon standing. pt a&ox4, ekg completed

## 2021-11-02 NOTE — H&P ADULT - ASSESSMENT
81 y.o. female with PMHx of HTN, HLD p/w dizziness and recent syncope.    1. Dizziness and recent syncope - bradycardia with no AVB - stop BBL, observe, may need PPM, ? tilt table test, orthostatic VS, hydration    2. Uncontrolled HTN - stop atenolol, increase norvasc and add losartan    3. HLD - cont statin    4. VTE proph - LMWH    Discussed with pt  GOC: Full code.  All previous medical records personally reviewed.  Time spent 54 min

## 2021-11-02 NOTE — CONSULT NOTE ADULT - SUBJECTIVE AND OBJECTIVE BOX
HPI:  81 year old female with history of HTN on Atenolol and Amlodipine. Patient has been experiencing intermittent dizziness and lightheadedness for the past week.  Patient was in [facility] on Friday and syncopized.  Patient notes that she may have been dehydrated prior to syncope.  Patient was evaluated by EMS and was told blood pressure was "okay", does not recall heart rate and patient declined ER evaluation.  Patient decided to be seen in urgent care today for continued dizziness and was noted to have heart rate 40s.  Patient was referred to ED for further evaluation.  Patient denies any CP, palpitations, SOB, dizziness, lightheadedness at this time.  In ED EKG SB@46bpm.  Patient reports that she took her Atenolol this morning.  EP asked to evaluate for bradycardia.        PAST MEDICAL & SURGICAL HISTORY:  Hypercholesteremia    Hypertension    Cataract Right Eye    H/O RD (retinal detachment)  with sx repair        MEDICATIONS  (STANDING):  amLODIPine   Tablet 10 milliGRAM(s) Oral daily  atorvastatin 10 milliGRAM(s) Oral at bedtime    MEDICATIONS  (PRN):      Allergies    adhesives (Rash)  No Known Drug Allergies            SOCIAL HISTORY: Denies tobacco, etoh abuse or illicit drug use    FAMILY HISTORY:  FH: CAD (coronary artery disease) (Father, Mother)        Vital Signs Last 24 Hrs  T(C): 36.7 (2021 13:42), Max: 36.7 (2021 13:42)  T(F): 98.1 (2021 13:42), Max: 98.1 (2021 13:42)  HR: 46 (2021 14:20) (46 - 53)  BP: 186/74 (2021 14:20) (186/74 - 199/103)  BP(mean): --  RR: 19 (2021 13:42) (19 - 19)  SpO2: 98% (2021 13:42) (98% - 98%)    REVIEW OF SYSTEMS:    CONSTITUTIONAL:  As per HPI.  HEENT:  Eyes:  No diplopia or blurred vision. ENT:  No earache, sore throat or runny nose.  CARDIOVASCULAR:  No pressure, squeezing, strangling, tightness, heaviness or aching about the chest, neck, axilla or epigastrium.  RESPIRATORY:  No cough, shortness of breath, PND or orthopnea.  GASTROINTESTINAL:  No nausea, vomiting or diarrhea.  GENITOURINARY:  No dysuria, frequency or urgency.  MUSCULOSKELETAL:  As per HPI.  SKIN:  No change in skin, hair or nails.  NEUROLOGIC:  No paresthesias, fasciculations, seizures or weakness.  PSYCHIATRIC:  No disorder of thought or mood.  ENDOCRINE:  No heat or cold intolerance, polyuria or polydipsia.  HEMATOLOGICAL:  No easy bruising or bleedings:  .     PHYSICAL EXAMINATION:    GENERAL APPEARANCE:  Pt. is not currently dyspneic, in no distress. Pt. is alert, oriented, and pleasant.  HEENT:  Pupils are normal and react normally. No icterus. Mucous membranes well colored.  NECK:  Supple. No lymphadenopathy. Jugular venous pressure not elevated. Carotids equal.   HEART: Bradycardia, regular. There are no murmurs, rubs or gallops noted  CHEST:  Chest is clear to auscultation. Normal respiratory effort.  ABDOMEN:  Soft and nontender.   EXTREMITIES:  There is no edema.   SKIN:  No rash or significant lesions are noted.    I&O's Summary      LABS:                        13.1   9.05  )-----------( 241      ( 2021 13:35 )             40.9     11-    143  |  107  |  17  ----------------------------<  104<H>  4.4   |  29  |  0.84    Ca    8.7      2021 13:35  Mg     2.3     11-    TPro  7.1  /  Alb  3.3  /  TBili  0.5  /  DBili  x   /  AST  21  /  ALT  19  /  AlkPhos  77  11-02    LIVER FUNCTIONS - ( 2021 13:35 )  Alb: 3.3 g/dL / Pro: 7.1 gm/dL / ALK PHOS: 77 U/L / ALT: 19 U/L / AST: 21 U/L / GGT: x           PT/INR - ( 2021 13:35 )   PT: 12.7 sec;   INR: 1.09 ratio         PTT - ( 2021 13:35 )  PTT:35.7 sec  CARDIAC MARKERS ( 2021 13:35 )  x     / x     / 57 U/L / x     / x          Urinalysis Basic - ( 2021 14:52 )    Color: Yellow / Appearance: Clear / S.010 / pH: x  Gluc: x / Ketone: Negative  / Bili: Negative / Urobili: Negative mg/dL   Blood: x / Protein: Negative mg/dL / Nitrite: Negative   Leuk Esterase: Trace / RBC: Negative /HPF / WBC 3-5   Sq Epi: x / Non Sq Epi: Few / Bacteria: Many          EK2021  SB@46bpm  MO: 176ms  QRS: 96ms  QT/QTc: 474/414ms    TELEMETRY: SR 45-60    CARDIAC TESTS:        RADIOLOGY & ADDITIONAL STUDIES:  < from: Xray Chest 1 View- PORTABLE-Urgent (Xray Chest 1 View- PORTABLE-Urgent .) (21 @ 13:58) >  INTERPRETATION:  AP erect chest on 2021 at 1:49 PM. Patient has dizziness.    COMPARISON: None available.    Heart magnified by technique.    The lung fields and pleural surfaces are unremarkable.    IMPRESSION: No acute finding.

## 2021-11-02 NOTE — ED ADULT NURSE REASSESSMENT NOTE - NS ED NURSE REASSESS COMMENT FT1
pt assisted to bathroom. urine sample provided and sent to lab. dinner ordered for patient. report given to holding EJ Bethea.

## 2021-11-02 NOTE — ED PROVIDER NOTE - PROGRESS NOTE DETAILS
Denice HOLCOMB: Patient bradycardic- HR in 40s; on atenolol and amlodipine; hypertensive in ED; endorsed to Dr. Carrillo for admission; EP consulted. Denice HOLCOMB: spoke with JUAN DIEGO Olsen for EP- will come to see patient.

## 2021-11-02 NOTE — PHARMACOTHERAPY INTERVENTION NOTE - COMMENTS
Medication history complete, reviewed medications with patient [facility]

## 2021-11-02 NOTE — ED PROVIDER NOTE - PSYCHIATRIC, MLM
April 7, 2020      [facility]  [address] [facility]  [place] [address]  Phone: [phone]  Fax: [phone]       Patient: Rika June   YOB: 1963  Date of Visit: 04/07/2020    To Whom It May Concern:    Jory June  was at [facility] on 04/07/2020. She has asthma, a medical condition that places her at high risk for complications if she were to develop COVID 19. She should take precautions to avoid individuals who could expose her to this condition if possible.  If you have any questions or concerns, or if I can be of further assistance, please do not hesitate to contact me.    Sincerely,          Elizabeth Santo MD      Alert and oriented to person, place, time/situation. normal mood and affect. no apparent risk to self or others.

## 2021-11-02 NOTE — H&P ADULT - NSHPPHYSICALEXAM_GEN_ALL_CORE
PHYSICAL EXAM:    General: pleasant elderly female in no acute distress  Eyes: PERRLA, EOMI; conjunctiva and sclera clear  Head: Normocephalic; atraumatic  ENMT: No nasal discharge; airway clear  Neck: Supple; non tender; no masses  Respiratory: No wheezes, rales or rhonchi  Cardiovascular: S1, S2 reg  bradycardic  Gastrointestinal: Soft non-tender non-distended; Normal bowel sounds  Genitourinary: No costovertebral angle tenderness  Extremities: Normal range of motion, No clubbing, cyanosis or edema  Vascular: Peripheral pulses palpable 2+ bilaterally  Neurological: Alert and oriented x4  Skin: Warm and dry. Diffuse scaly small patches  Musculoskeletal: Normal tone  Psychiatric: Cooperative and appropriate

## 2021-11-02 NOTE — CONSULT NOTE ADULT - ASSESSMENT
A/P: 81 year old female with history of HTN on Atenolol with intermittent dizziness, lightheadedness and episode of syncope      Sinus bradycardia, hemodynamically stable  No high degree AV block noted  Continue tele monitoring  Discontinue Atenolol, no further AV savannah blockers  Advised patient that if she continues to have persistent bradycardia or high degree AV block after discontinuation of Atenolol pacemaker may be indicated  Adjust antihypertensive medications  Will check echo  If bradycardia resolves off of Atenolol  consider MCOT upon discharge   Plan to be discussed with Dr. Schmidt

## 2021-11-02 NOTE — ED ADULT NURSE REASSESSMENT NOTE - NS ED NURSE REASSESS COMMENT FT1
Report obtained from ED nurse. Pt stable. No s/s distress noted. Dinner ordered. pt reeducateed to use call bell for assistance. Comfort and safety measures in place.

## 2021-11-02 NOTE — ED PROVIDER NOTE - CARE PLAN
Principal Discharge DX:	Bradycardia  Secondary Diagnosis:	Dizziness  Secondary Diagnosis:	Syncope   1

## 2021-11-02 NOTE — ED PROVIDER NOTE - IV ALTEPLASE DOOR HIDDEN
[FreeTextEntry1] : CAD: The patient is status post coronary intervention.  The patient has good exercise ability without exertional symptoms.  Unfortunately she recently broke her leg which is caused her to decrease her physical activity dramatically.  She is in the process of rehabilitation.\par \par Hypertension: The patient takes her blood pressure at home very frequently.  She is always normotensive at home.\par \par Hyperlipidemia: The patient had slightly elevated transaminases in the past.  Repeat cholesterol profile is pending.  The patient states she is still on her home portal that her cholesterol has increased.  We will consider another attempted up titration of pharmacologic therapy. show

## 2021-11-02 NOTE — H&P ADULT - NSICDXFAMILYHX_GEN_ALL_CORE_FT
FAMILY HISTORY:  Father  Still living? Unknown  FH: CAD (coronary artery disease), Age at diagnosis: Age Unknown    Mother  Still living? No  FH: CAD (coronary artery disease), Age at diagnosis: Age Unknown

## 2021-11-02 NOTE — H&P ADULT - HISTORY OF PRESENT ILLNESS
81 y.o. female with PMHx of HTN, HLD p/w dizziness for last few weeks and episode of syncope after ride to [facility] in the care with getting out of the car - evaluated by EMR and recovered, continued with her dinner with friends afterwards. Pt noted to have elevated BP in ED.  Other ROS reviewed and neg

## 2021-11-03 LAB
ANION GAP SERPL CALC-SCNC: 6 MMOL/L — SIGNIFICANT CHANGE UP (ref 5–17)
BUN SERPL-MCNC: 13 MG/DL — SIGNIFICANT CHANGE UP (ref 7–23)
CALCIUM SERPL-MCNC: 8.8 MG/DL — SIGNIFICANT CHANGE UP (ref 8.5–10.1)
CHLORIDE SERPL-SCNC: 110 MMOL/L — HIGH (ref 96–108)
CO2 SERPL-SCNC: 29 MMOL/L — SIGNIFICANT CHANGE UP (ref 22–31)
COVID-19 NUCLEOCAPSID GAM AB INTERP: NEGATIVE — SIGNIFICANT CHANGE UP
COVID-19 NUCLEOCAPSID TOTAL GAM ANTIBODY RESULT: 0.09 INDEX — SIGNIFICANT CHANGE UP
COVID-19 SPIKE DOMAIN AB INTERP: POSITIVE
COVID-19 SPIKE DOMAIN ANTIBODY RESULT: >250 U/ML — HIGH
CREAT SERPL-MCNC: 0.86 MG/DL — SIGNIFICANT CHANGE UP (ref 0.5–1.3)
GLUCOSE SERPL-MCNC: 101 MG/DL — HIGH (ref 70–99)
HCT VFR BLD CALC: 41.6 % — SIGNIFICANT CHANGE UP (ref 34.5–45)
HGB BLD-MCNC: 13.2 G/DL — SIGNIFICANT CHANGE UP (ref 11.5–15.5)
MCHC RBC-ENTMCNC: 31.3 PG — SIGNIFICANT CHANGE UP (ref 27–34)
MCHC RBC-ENTMCNC: 31.7 GM/DL — LOW (ref 32–36)
MCV RBC AUTO: 98.6 FL — SIGNIFICANT CHANGE UP (ref 80–100)
PLATELET # BLD AUTO: 227 K/UL — SIGNIFICANT CHANGE UP (ref 150–400)
POTASSIUM SERPL-MCNC: 4.3 MMOL/L — SIGNIFICANT CHANGE UP (ref 3.5–5.3)
POTASSIUM SERPL-SCNC: 4.3 MMOL/L — SIGNIFICANT CHANGE UP (ref 3.5–5.3)
RBC # BLD: 4.22 M/UL — SIGNIFICANT CHANGE UP (ref 3.8–5.2)
RBC # FLD: 13.4 % — SIGNIFICANT CHANGE UP (ref 10.3–14.5)
SARS-COV-2 IGG+IGM SERPL QL IA: 0.09 INDEX — SIGNIFICANT CHANGE UP
SARS-COV-2 IGG+IGM SERPL QL IA: >250 U/ML — HIGH
SARS-COV-2 IGG+IGM SERPL QL IA: NEGATIVE — SIGNIFICANT CHANGE UP
SARS-COV-2 IGG+IGM SERPL QL IA: POSITIVE
SODIUM SERPL-SCNC: 145 MMOL/L — SIGNIFICANT CHANGE UP (ref 135–145)
TSH SERPL-MCNC: 2.54 UU/ML — SIGNIFICANT CHANGE UP (ref 0.34–4.82)
WBC # BLD: 7.61 K/UL — SIGNIFICANT CHANGE UP (ref 3.8–10.5)
WBC # FLD AUTO: 7.61 K/UL — SIGNIFICANT CHANGE UP (ref 3.8–10.5)

## 2021-11-03 PROCEDURE — 99233 SBSQ HOSP IP/OBS HIGH 50: CPT

## 2021-11-03 PROCEDURE — 93306 TTE W/DOPPLER COMPLETE: CPT | Mod: 26

## 2021-11-03 RX ADMIN — ATORVASTATIN CALCIUM 10 MILLIGRAM(S): 80 TABLET, FILM COATED ORAL at 21:07

## 2021-11-03 RX ADMIN — AMLODIPINE BESYLATE 10 MILLIGRAM(S): 2.5 TABLET ORAL at 09:54

## 2021-11-03 RX ADMIN — LOSARTAN POTASSIUM 50 MILLIGRAM(S): 100 TABLET, FILM COATED ORAL at 09:55

## 2021-11-03 RX ADMIN — ENOXAPARIN SODIUM 40 MILLIGRAM(S): 100 INJECTION SUBCUTANEOUS at 21:07

## 2021-11-03 NOTE — PROGRESS NOTE ADULT - ASSESSMENT
81 year old female with complaints of lightheadedness and dizziness and syncope who was  found to be bradycardic.  Her betablocker have been on hold.  She hs normal LV function (echo).    Continue to monitor this pt  MCOT upon discharge  Hydrate patient  Continue to hold AV Blocking agents

## 2021-11-03 NOTE — PROGRESS NOTE ADULT - ASSESSMENT
81 y.o. female with PMHx of HTN, HLD p/w dizziness and recent syncope.    1) Sinus Bradycardia    - Off BB and still in high 40s    - Asymptomatic this am    - EP following    - Consider PM if no improvement off BB    - Check TSh    2) HTN     - Continue Amlodipine and losartan    - Blood pressure improved    3) HLD   - Atorvastatin    4) DVT prophylaxis    - Lovenox

## 2021-11-03 NOTE — PROGRESS NOTE ADULT - SUBJECTIVE AND OBJECTIVE BOX
Patient seen and examined:  No chest pain or sob.   Still sinus justin on the monitor  but asymptomatic.  Tolerating po and voiding well.    ROS: Negative except for above      Vital Signs Last 24 Hrs  T(C): 36.7 (03 Nov 2021 08:57), Max: 36.8 (02 Nov 2021 20:21)  T(F): 98.1 (03 Nov 2021 08:57), Max: 98.2 (02 Nov 2021 20:21)  HR: 53 (03 Nov 2021 08:57) (46 - 73)  BP: 143/66 (03 Nov 2021 08:57) (135/64 - 199/103)  BP(mean): 91 (02 Nov 2021 18:46) (91 - 91)  RR: 18 (03 Nov 2021 08:57) (18 - 19)  SpO2: 99% (03 Nov 2021 08:57) (98% - 99%)    PHYSICAL EXAM:  Constitutional: NAD, awake and alert, well-developed  HEENT: PERR, EOMI, Normal Hearing, MMM  Neck: Soft and supple, No LAD, No JVD  Respiratory: Breath sounds are clear bilaterally, No wheezing, rales or rhonchi  Cardiovascular: S1 and S2, sinus justin rate and rhythm, no Murmurs, gallops or rubs  Gastrointestinal: Bowel Sounds present, soft, nontender, nondistended, no guarding, no rebound  Extremities: No peripheral edema  Vascular: 2+ peripheral pulses  Neurological: A/O x 3, no focal deficits  Musculoskeletal: 5/5 strength b/l upper and lower extremities  Skin: No rashes    LABS: All Labs Reviewed:                        13.2   7.61  )-----------( 227      ( 03 Nov 2021 07:09 )             41.6     11-03    145  |  110<H>  |  13  ----------------------------<  101<H>  4.3   |  29  |  0.86    Ca    8.8      03 Nov 2021 07:09  Mg     2.3     11-02    TPro  7.1  /  Alb  3.3  /  TBili  0.5  /  DBili  x   /  AST  21  /  ALT  19  /  AlkPhos  77  11-02    PT/INR - ( 02 Nov 2021 13:35 )   PT: 12.7 sec;   INR: 1.09 ratio         PTT - ( 02 Nov 2021 13:35 )  PTT:35.7 sec  CARDIAC MARKERS ( 02 Nov 2021 13:35 )  x     / x     / 57 U/L / x     / x        Tele: HR 45-54

## 2021-11-03 NOTE — PROGRESS NOTE ADULT - SUBJECTIVE AND OBJECTIVE BOX
81 year old female with history of HTN on Atenolol and Amlodipine. Patient has been experiencing intermittent dizziness and lightheadedness for the past week.  Patient was in [facility] on Friday and syncopized.  Patient notes that she may have been dehydrated prior to syncope.  Patient was evaluated by EMS and was told blood pressure was "okay", does not recall heart rate and patient declined ER evaluation.  Patient decided to be seen in urgent care today for continued dizziness and was noted to have heart rate 40s.  Patient was referred to ED for further evaluation.  Patient denies any CP, palpitations, SOB, dizziness, lightheadedness.      TELE: SB 50s    MEDICATIONS  (STANDING):  amLODIPine   Tablet 10 milliGRAM(s) Oral daily  atorvastatin 10 milliGRAM(s) Oral at bedtime  enoxaparin Injectable 40 milliGRAM(s) SubCutaneous daily  losartan 50 milliGRAM(s) Oral daily    MEDICATIONS  (PRN):      Allergies    adhesives (Rash)  No Known Drug Allergies    Intolerances        Vital Signs Last 24 Hrs  T(C): 36.7 (2021 08:57), Max: 36.8 (2021 20:21)  T(F): 98.1 (2021 08:57), Max: 98.2 (2021 20:21)  HR: 53 (2021 08:57) (48 - 73)  BP: 143/66 (2021 08:57) (135/64 - 171/68)  BP(mean): 91 (2021 18:46) (91 - 91)  RR: 18 (2021 08:57) (18 - 18)  SpO2: 99% (2021 08:57) (98% - 99%)      LABS:                        13.2   7.61  )-----------( 227      ( 2021 07:09 )             41.6     11-03    145  |  110<H>  |  13  ----------------------------<  101<H>  4.3   |  29  |  0.86    Ca    8.8      2021 07:09  Mg     2.3     11-02    TPro  7.1  /  Alb  3.3  /  TBili  0.5  /  DBili  x   /  AST  21  /  ALT  19  /  AlkPhos  77  11-02    PT/INR - ( 2021 13:35 )   PT: 12.7 sec;   INR: 1.09 ratio         PTT - ( 2021 13:35 )  PTT:35.7 sec  Urinalysis Basic - ( 2021 14:52 )    Color: Yellow / Appearance: Clear / S.010 / pH: x  Gluc: x / Ketone: Negative  / Bili: Negative / Urobili: Negative mg/dL   Blood: x / Protein: Negative mg/dL / Nitrite: Negative   Leuk Esterase: Trace / RBC: Negative /HPF / WBC 3-5   Sq Epi: x / Non Sq Epi: Few / Bacteria: Many      CARDIAC MARKERS ( 2021 13:35 )  x     / x     / 57 U/L / x     / x            RADIOLOGY & ADDITIONAL TESTS:

## 2021-11-04 ENCOUNTER — TRANSCRIPTION ENCOUNTER (OUTPATIENT)
Age: 81
End: 2021-11-04

## 2021-11-04 VITALS
SYSTOLIC BLOOD PRESSURE: 153 MMHG | OXYGEN SATURATION: 97 % | TEMPERATURE: 98 F | HEART RATE: 51 BPM | DIASTOLIC BLOOD PRESSURE: 66 MMHG | RESPIRATION RATE: 18 BRPM

## 2021-11-04 PROCEDURE — 99238 HOSP IP/OBS DSCHRG MGMT 30/<: CPT

## 2021-11-04 RX ORDER — CEFUROXIME AXETIL 250 MG
1 TABLET ORAL
Qty: 10 | Refills: 0
Start: 2021-11-04

## 2021-11-04 RX ORDER — AMLODIPINE BESYLATE 2.5 MG/1
1 TABLET ORAL
Qty: 0 | Refills: 0 | DISCHARGE

## 2021-11-04 RX ORDER — LOSARTAN POTASSIUM 100 MG/1
1 TABLET, FILM COATED ORAL
Qty: 30 | Refills: 0
Start: 2021-11-04

## 2021-11-04 RX ORDER — AMLODIPINE BESYLATE 2.5 MG/1
1 TABLET ORAL
Qty: 30 | Refills: 0
Start: 2021-11-04

## 2021-11-04 RX ORDER — ATENOLOL 25 MG/1
1 TABLET ORAL
Qty: 0 | Refills: 0 | DISCHARGE

## 2021-11-04 RX ORDER — CX-024414 0.2 MG/ML
0.5 INJECTION, SUSPENSION INTRAMUSCULAR
Qty: 0 | Refills: 0 | DISCHARGE

## 2021-11-04 RX ORDER — CEFUROXIME AXETIL 250 MG
500 TABLET ORAL EVERY 12 HOURS
Refills: 0 | Status: DISCONTINUED | OUTPATIENT
Start: 2021-11-04 | End: 2021-11-04

## 2021-11-04 RX ADMIN — AMLODIPINE BESYLATE 10 MILLIGRAM(S): 2.5 TABLET ORAL at 10:25

## 2021-11-04 RX ADMIN — LOSARTAN POTASSIUM 50 MILLIGRAM(S): 100 TABLET, FILM COATED ORAL at 10:25

## 2021-11-04 NOTE — PROGRESS NOTE ADULT - SUBJECTIVE AND OBJECTIVE BOX
Patient seen and examined:  No acute events overnight.  No chest pain or sob. Voiding well.  Tolerating po. Some increased   frequency for the past few days.  No fever. HR 60- 70s overnight.    ROS: Negative except for above      Vital Signs Last 24 Hrs  T(C): 36.4 (04 Nov 2021 08:19), Max: 37 (03 Nov 2021 20:18)  T(F): 97.5 (04 Nov 2021 08:19), Max: 98.6 (03 Nov 2021 20:18)  HR: 51 (04 Nov 2021 08:19) (51 - 61)  BP: 153/66 (04 Nov 2021 08:19) (136/70 - 153/66)  BP(mean): --  RR: 18 (04 Nov 2021 08:19) (18 - 18)  SpO2: 97% (04 Nov 2021 08:19) (97% - 97%)    PHYSICAL EXAM:  Constitutional: NAD, awake and alert, well-developed  HEENT: PERR, EOMI, Normal Hearing, MMM  Neck: Soft and supple, No LAD, No JVD  Respiratory: Breath sounds are clear bilaterally, No wheezing, rales or rhonchi  Cardiovascular: S1 and S2, regular rate and rhythm, no Murmurs, gallops or rubs  Gastrointestinal: Bowel Sounds present, soft, nontender, nondistended, no guarding, no rebound  Extremities: No peripheral edema  Vascular: 2+ peripheral pulses  Neurological: A/O x 3, no focal deficits  Musculoskeletal: 5/5 strength b/l upper and lower extremities  Skin: No rashes    LABS: All Labs Reviewed:                        13.2   7.61  )-----------( 227      ( 03 Nov 2021 07:09 )             41.6     11-03    145  |  110<H>  |  13  ----------------------------<  101<H>  4.3   |  29  |  0.86    Ca    8.8      03 Nov 2021 07:09  Mg     2.3     11-02    TPro  7.1  /  Alb  3.3  /  TBili  0.5  /  DBili  x   /  AST  21  /  ALT  19  /  AlkPhos  77  11-02    PT/INR - ( 02 Nov 2021 13:35 )   PT: 12.7 sec;   INR: 1.09 ratio         PTT - ( 02 Nov 2021 13:35 )  PTT:35.7 sec  CARDIAC MARKERS ( 02 Nov 2021 13:35 )  x     / x     / 57 U/L / x     / x        Urine cultue: Gram Negative rods

## 2021-11-04 NOTE — DISCHARGE NOTE PROVIDER - NSDCFUSCHEDAPPT_GEN_ALL_CORE_FT
ALONDRA LOUIS ; 12/23/2021 ; [facility] [address] ALONDRA Toscano ; 01/24/2022 ; [facility] [address] [facility]

## 2021-11-04 NOTE — DISCHARGE NOTE NURSING/CASE MANAGEMENT/SOCIAL WORK - PATIENT PORTAL LINK FT
You can access the FollowMyHealth Patient Portal offered by [facility] by registering at the following website: [email or web address]. By joining [email or web address]’s FollowMyHealth portal, you will also be able to view your health information using other applications (apps) compatible with our system.

## 2021-11-04 NOTE — DISCHARGE NOTE PROVIDER - NSDCMRMEDTOKEN_GEN_ALL_CORE_FT
amLODIPine 10 mg oral tablet: 1 tab(s) orally once a day  cefuroxime 500 mg oral tablet: 1 tab(s) orally every 12 hours  cyanocobalamin 500 mcg oral tablet: 1 tab(s) orally once a day  Fish Oil oral capsule: 1 cap(s) orally once a day  losartan 50 mg oral tablet: 1 tab(s) orally once a day  lovastatin 20 mg oral tablet: 1 tab(s) orally once a day  Lutein 6 mg oral capsule: 1 cap(s) orally once a day  Vitamin D3 25 mcg (1000 intl units) oral tablet: 1 tab(s) orally once a day

## 2021-11-04 NOTE — PROGRESS NOTE ADULT - ASSESSMENT
81 y.o. female with PMHx of HTN, HLD p/w dizziness and recent syncope.    1) Sinus Bradycardia    - Off BB and HR 60s    - Asymptomatic this am    - EP following, suggest MCOT    - For DC today    - TSH normal    2) HTN     - Continue Amlodipine and losartan    - Blood pressure improved    3) HLD   - Atorvastatin    4) UTI    - FU CS    - Gram neg rods    - Will tx with ceftin    5) DVT    - Lovenox

## 2021-11-04 NOTE — CHART NOTE - NSCHARTNOTEFT_GEN_A_CORE
81 year old female with history of HTN on Atenolol and Amlodipine. Patient has been experiencing intermittent dizziness and lightheadedness for the past week.  Patient was in [facility] on Friday and synopsized.  Patient notes that she may have been dehydrated prior to syncope.  Patient was evaluated by EMS and was told blood pressure was "okay", does not recall heart rate and patient declined ER evaluation.  Patient decided to be seen in urgent care today for continued dizziness and was noted to have heart rate 40s.  Patient was referred to ED for further evaluation.  Patient denies any CP, palpitations, SOB, dizziness, lightheadedness.  She has a preserved LVEF.  Pt has been off AV Blocking agents.  Telemetry show SR at 66 during wake hours and SB at 50 during sleep.  Per Dr. Schmidt arrangements for either a MCOT or ZIO will be made prior to discharge.  Please call EP office [phone] to notify office prior to discharge.

## 2021-11-04 NOTE — DISCHARGE NOTE PROVIDER - HOSPITAL COURSE
81 y.o. female with PMHx of HTN, HLD p/w dizziness and recent syncope.    1) Sinus Bradycardia    - Off BB and HR 60s    - Asymptomatic this am    - EP following, suggest MCOT    - For DC today    - TSH normal    2) HTN     - Continue Amlodipine and losartan    - Blood pressure improved    3) HLD   - Atorvastatin    4) UTI    - FU CS    - Gram neg rods    - Will tx with ceftin

## 2021-11-06 PROBLEM — N39.0 ACUTE UTI: Status: RESOLVED | Noted: 2021-11-06 | Resolved: 2021-12-06

## 2021-11-06 PROBLEM — Z87.898 HISTORY OF SYNCOPE: Status: ACTIVE | Noted: 2021-11-04

## 2021-11-08 ENCOUNTER — APPOINTMENT (OUTPATIENT)
Dept: FAMILY MEDICINE | Facility: CLINIC | Age: 81
End: 2021-11-08
Payer: MEDICARE

## 2021-11-08 VITALS — DIASTOLIC BLOOD PRESSURE: 62 MMHG | SYSTOLIC BLOOD PRESSURE: 118 MMHG

## 2021-11-08 VITALS
HEART RATE: 58 BPM | DIASTOLIC BLOOD PRESSURE: 78 MMHG | WEIGHT: 215 LBS | SYSTOLIC BLOOD PRESSURE: 146 MMHG | BODY MASS INDEX: 32.58 KG/M2 | HEIGHT: 68 IN | TEMPERATURE: 97.7 F | OXYGEN SATURATION: 97 %

## 2021-11-08 DIAGNOSIS — Z87.898 PERSONAL HISTORY OF OTHER SPECIFIED CONDITIONS: ICD-10-CM

## 2021-11-08 DIAGNOSIS — N39.0 URINARY TRACT INFECTION, SITE NOT SPECIFIED: ICD-10-CM

## 2021-11-08 PROCEDURE — 99214 OFFICE O/P EST MOD 30 MIN: CPT

## 2021-11-08 RX ORDER — ATENOLOL 50 MG/1
50 TABLET ORAL
Qty: 90 | Refills: 3 | Status: DISCONTINUED | COMMUNITY
Start: 2018-03-16 | End: 2021-11-08

## 2021-11-08 RX ORDER — AMLODIPINE BESYLATE 2.5 MG/1
2.5 TABLET ORAL DAILY
Qty: 90 | Refills: 3 | Status: DISCONTINUED | COMMUNITY
Start: 2021-07-23 | End: 2021-11-08

## 2021-11-08 NOTE — REVIEW OF SYSTEMS
[Joint Pain] : joint pain [Joint Stiffness] : joint stiffness [Anxiety] : anxiety [Negative] : Heme/Lymph [Fever] : no fever [Chills] : no chills [Fatigue] : no fatigue [Recent Change In Weight] : ~T no recent weight change [Chest Pain] : no chest pain [Palpitations] : no palpitations [Lower Ext Edema] : no lower extremity edema [Shortness Of Breath] : no shortness of breath [Wheezing] : no wheezing [Cough] : no cough [Dyspnea on Exertion] : no dyspnea on exertion [Abdominal Pain] : no abdominal pain [Diarrhea] : diarrhea [Vomiting] : no vomiting [Dysuria] : no dysuria [Incontinence] : no incontinence [Hematuria] : no hematuria [Frequency] : no frequency [Skin Rash] : no skin rash [Headache] : no headache [Dizziness] : no dizziness [Fainting] : no fainting [Depression] : no depression [FreeTextEntry9] : OA related joint pain and stiffness, doing PT and also working with  which helps [de-identified] : h/o multiple skin cancers (non melanoma) and she is followed closely by derm with regular skin checks [de-identified] : occas situational anxiety

## 2021-11-08 NOTE — PLAN
[FreeTextEntry1] : Reviewed hosp course and records w/ pt and her sister today incl final urine culture results with her; it's not clear to me whether she had true UTI vs. more likely asymptomatic bacteruria but as she has only 1 dose of antibiotic left and has tolerated it well she will take final dose and no indication for any addtl treatment at this time. She was also noted to have excellent COVID ab level of >250 in hosp last week. \par \par Continue same meds/doses. BP goal is <=135/85 on avg on home checks and recommended she consider getting a home BP monitor to track HR and BPs. HR nl range is  but 55-60 HR is fine also for her as long as she continues to feel well. She has MOCT in place and info being conveyed to EP card team. She can let me know if she has concerns about her BP and HR on home checks. \par \par Continue close cardiology f/u and continued monitoring for possible underlying arrhythmia. \par \par Discussed clean eating (eg Mediterranean style eating plan) and regular exercise/staying as physically active as possible. Revd concepts of motion is lotion and move more, sit less. Hydrate well. \par \par Reviewed importance of good self care (eg meditation, yoga, adequate rest, regular exercise, magnesium, clean eating etc). Revd r/b/se Alprazolam and that it is not recommended generally at this stage of life but as med is used infreq and still well tolerated and effective for her ok for her to cont to keep on hand for now. She will d/c med if at any point she has any SE and she will cont to use only sparingly. \par \par Last PE was 1/2020 so will sched next visit as a PE visit in next few months.

## 2021-11-08 NOTE — HISTORY OF PRESENT ILLNESS
[Other: _____] : [unfilled] [FreeTextEntry1] : ALONDRA LOUIS is a 81 year old female here for a follow up visit s/p recent hospitalization 11/2-4/21 for dizziness, syncope, hypotension.  [de-identified] : Here for f/u of hypercholesterolemia and hypertension and impaired fasting glucose and OA. She also had recent hospitalization at  11/2/21-11/4/21 for dizziness, syncope and hypotension as well as was incidentally noted to have UTI. She had syncope event on 10/29/2021 after walking uphill while in [facility] for lunch. Then did not feel great over the next few days and then she went to urgent care and was found to be very bradycardic so was sent to ED where was noted to have very elevated BP and severe bradycardia and so was admitted. \par \par Hosp f/u--- She had her beta blocker med d/c'ed and HR improved. ARB med was added and amlodipine dose incr from 2.5 mg to 10 mg and BPs improved. EP card consulted and recommended MCOT which was placed and she will be f/u with card in near future. Her urine culture grew >100,000 CFU/ml of Klebsiella pneumonia sensitive to cephalosporins so the Ceftin she was Rx'ed should be effective to resolve UTI; she notes that she never had any sxs of UTI and still feels well from  standpoint but is tolerating the ceftin and has only 1 dose left so will complete this. Dr. Schmidt's NP team followed her in the hospital. She feels well without any syncope/near syncope or CP or palps or SOB since her d/c. \par \par She concedes that she does not hydrate as well as she should but she is being more mindful of this since hosp d/c.  \par \par Takes meds consistently and is tolerating them well. She has been taking folic acid for years on recommendation of her prior PMD (Dr. Lemon) but it is not clear why she is taking this. We have revd that it is not clear that this is a med she needs to continue long term. Her old records do not clearly indicate reason for use of folic acid. We have Revd pros/cons of folic acid supplementation at this stage of life. She prefers to cont folic acid for now as is well tolerated\par \par Joint pain and stiffness is reasonably well controlled with keeping as physically active as possible. \par \par She has occasional situational anxiety and when this occurs she takes 1/2 of a 0.25 mg Alprazolam with good effect and med is well tolerated so she likes to keep it on hand for prn use. \par \par She is doing her own exercises with  as well as doing PT 2x/wk. She is eating well. \par \ирина Has had Moderna primary COVID series and booster Moderna dose 10/21/2021. She also had her flu vacc this season.

## 2021-11-08 NOTE — HEALTH RISK ASSESSMENT
[0] : 2) Feeling down, depressed, or hopeless: Not at all (0) [PHQ-2 Negative - No further assessment needed] : PHQ-2 Negative - No further assessment needed [[id number]] : 0

## 2021-11-08 NOTE — PHYSICAL EXAM
[No Acute Distress] : no acute distress [Well Developed] : well developed [Well-Appearing] : well-appearing [Normal Sclera/Conjunctiva] : normal sclera/conjunctiva [EOMI] : extraocular movements intact [No JVD] : no jugular venous distention [No Lymphadenopathy] : no lymphadenopathy [Supple] : supple [Thyroid Normal, No Nodules] : the thyroid was normal and there were no nodules present [No Respiratory Distress] : no respiratory distress  [No Accessory Muscle Use] : no accessory muscle use [Clear to Auscultation] : lungs were clear to auscultation bilaterally [Regular Rhythm] : with a regular rhythm [Normal S1, S2] : normal S1 and S2 [No Carotid Bruits] : no carotid bruits [No Varicosities] : no varicosities [Pedal Pulses Present] : the pedal pulses are present [No Edema] : there was no peripheral edema [No Extremity Clubbing/Cyanosis] : no extremity clubbing/cyanosis [Soft] : abdomen soft [Non Tender] : non-tender [Non-distended] : non-distended [No Masses] : no abdominal mass palpated [No HSM] : no HSM [Normal Bowel Sounds] : normal bowel sounds [No Joint Swelling] : no joint swelling [Grossly Normal Strength/Tone] : grossly normal strength/tone [Coordination Grossly Intact] : coordination grossly intact [No Rash] : no rash [No Focal Deficits] : no focal deficits [Normal Gait] : normal gait [Normal Affect] : the affect was normal [Normal Insight/Judgement] : insight and judgment were intact [de-identified] : mildly obese, rpt BP taken with lg adult cuff after she has been seated and resting for a time is wnl.  [de-identified] : 1/6 soft systolic murmur heard along LSB, no ectopy heard, HR upper 50s/60 range on my exam [de-identified] : +sclerotic joints B hands/wrists/knees etc but no s/sxs acute synovitis [de-identified] : +signif UV damage noted to skin

## 2021-11-08 NOTE — ASSESSMENT
[FreeTextEntry1] : ALONDRA LOUIS is a 81 year old female here for a follow up visit s/p recent hospitalization for syncope/dizziness/UTI and for above noted medical issues.

## 2021-11-09 ENCOUNTER — NON-APPOINTMENT (OUTPATIENT)
Age: 81
End: 2021-11-09

## 2021-11-10 DIAGNOSIS — N39.0 URINARY TRACT INFECTION, SITE NOT SPECIFIED: ICD-10-CM

## 2021-11-10 DIAGNOSIS — I10 ESSENTIAL (PRIMARY) HYPERTENSION: ICD-10-CM

## 2021-11-10 DIAGNOSIS — B96.1 KLEBSIELLA PNEUMONIAE [K. PNEUMONIAE] AS THE CAUSE OF DISEASES CLASSIFIED ELSEWHERE: ICD-10-CM

## 2021-11-10 DIAGNOSIS — E78.00 PURE HYPERCHOLESTEROLEMIA, UNSPECIFIED: ICD-10-CM

## 2021-11-10 DIAGNOSIS — R00.1 BRADYCARDIA, UNSPECIFIED: ICD-10-CM

## 2021-11-13 NOTE — DISCUSSION/SUMMARY
[de-identified] : Today I had a lengthy discussion with the patient regarding their left knee pain. I have addressed all the patient's concerns surrounding the pathology of their condition. XR imaging was completed in office today and results were reviewed with the patient, revealing osteoarthritis. I recommend the patient undergo a course of physical therapy for the left knee 2-3 times a week for a total of 6-8 weeks. A prescription was given for the physical therapy today.  She may weight bear as tolerated. \par \par A discussion was also had about utilizing arch support orthotics with the patient today.		\par \par For inflammation and pain, I recommend that the patient utilize ice, NSAIDs, and heat PRN. They can also elevate their left knee above the level of the heart. I also advised that the patient utilize Voltaren gel topically. If the Voltaren gel could not be obtained, Icy Hot, Biofreeze, or Bengay can be utilized instead\par 		\par The patient understood and verbally agreed to the treatment plan. All of their questions were answered and they were satisfied with the visit. The patient should call the office if they have any questions or experience worsening symptoms. I would like to see the patient back in the office PRN  to reassess their condition. 				\par  Suture Removal: 14 days

## 2021-11-20 ENCOUNTER — RX RENEWAL (OUTPATIENT)
Age: 81
End: 2021-11-20

## 2021-11-22 ENCOUNTER — NON-APPOINTMENT (OUTPATIENT)
Age: 81
End: 2021-11-22

## 2021-12-14 ENCOUNTER — APPOINTMENT (OUTPATIENT)
Dept: FAMILY MEDICINE | Facility: CLINIC | Age: 81
End: 2021-12-14
Payer: MEDICARE

## 2021-12-14 PROCEDURE — ZZZZZ: CPT

## 2021-12-20 ENCOUNTER — RESULT CHARGE (OUTPATIENT)
Age: 81
End: 2021-12-20

## 2021-12-21 ENCOUNTER — NON-APPOINTMENT (OUTPATIENT)
Age: 81
End: 2021-12-21

## 2021-12-21 ENCOUNTER — APPOINTMENT (OUTPATIENT)
Dept: ELECTROPHYSIOLOGY | Facility: CLINIC | Age: 81
End: 2021-12-21
Payer: MEDICARE

## 2021-12-21 VITALS
DIASTOLIC BLOOD PRESSURE: 73 MMHG | OXYGEN SATURATION: 98 % | RESPIRATION RATE: 16 BRPM | SYSTOLIC BLOOD PRESSURE: 123 MMHG | WEIGHT: 215 LBS | BODY MASS INDEX: 32.58 KG/M2 | HEART RATE: 88 BPM | HEIGHT: 68 IN

## 2021-12-21 PROCEDURE — 99213 OFFICE O/P EST LOW 20 MIN: CPT

## 2021-12-21 PROCEDURE — 93000 ELECTROCARDIOGRAM COMPLETE: CPT

## 2021-12-21 RX ORDER — CEFUROXIME AXETIL 500 MG/1
500 TABLET ORAL
Qty: 10 | Refills: 0 | Status: DISCONTINUED | COMMUNITY
Start: 2021-11-04 | End: 2021-12-21

## 2021-12-21 RX ORDER — FOLIC ACID 1 MG/1
1 TABLET ORAL
Qty: 90 | Refills: 3 | Status: DISCONTINUED | COMMUNITY
End: 2021-12-21

## 2022-01-04 NOTE — ASSESSMENT
[FreeTextEntry1] : This is a 81 year old woman admitted to  in November with bradycardia and a UTI. Her BP medications were changed and she was discharged with a MCOT. No bradycardia on monitor. Hypertension well controlled off beta blocker. \par \par Will continue medications as below.

## 2022-01-04 NOTE — HISTORY OF PRESENT ILLNESS
[FreeTextEntry1] : This is a 81 year old woman admitted to [facility]  11/2/21 with dizziness and near syncope. She was found to be bradycardic and her beta blocker discontinued. Since hospitalization , ALONDRA LOUIS  denies chest pain, chest pressure, shortness of breath, lightheadedness, dizziness, palpitations, syncope, presyncope, orthopnea, PND, or edema.

## 2022-01-04 NOTE — CARDIOLOGY SUMMARY
[de-identified] : 12/21/21 : Sinus 78 PRWP [de-identified] : 12/3/21 : 28 d 9 h 57 m monitoring. HR  bpm Avg 68. No AF,SVT, pauses, heart block, VT.

## 2022-01-05 ENCOUNTER — FORM ENCOUNTER (OUTPATIENT)
Age: 82
End: 2022-01-05

## 2022-01-19 ENCOUNTER — APPOINTMENT (OUTPATIENT)
Dept: DERMATOLOGY | Facility: CLINIC | Age: 82
End: 2022-01-19
Payer: MEDICARE

## 2022-01-19 PROCEDURE — 99213 OFFICE O/P EST LOW 20 MIN: CPT | Mod: 25

## 2022-01-19 PROCEDURE — 17000 DESTRUCT PREMALG LESION: CPT

## 2022-01-19 NOTE — PHYSICAL EXAM
[Full Body Skin Exam Performed] : performed [FreeTextEntry3] : Skin examination performed of the face, neck, trunk, arms, legs; \par The patient is well, alert and oriented, pleasant and cooperative.\par Eyelids, conjunctivae, oral mucosa, digits and nails all normal.  \par No cervical adenopathy.\par \par Normal findings include:\par \par Seborrheic keratoses\par Angiomas\par Lentigines\par + DSAP; UEs, LEs; \par \par healed scar; L chin, R paranasal\par \par Healed flap scar L paranasal - with inflamed seborrheic scale in alar crease\par scaling erythematous papule; L temple;  Scaling waxy stuck on papule; R temple\par \par No lesions suspicious for malignancy. \par

## 2022-01-19 NOTE — ASSESSMENT
[FreeTextEntry1] : Complete skin examination is negative for malignancy; Multiple new concerns were addressed and discussed.\par Therapeutic options and their risks and benefits; along with multiple diagnostic possibilities were discussed at length;\par risks and benefits of skin biopsy and/or other further study were discussed;\par \par Recent Mohs L nose healed very well\par \par Continue regular exams;  Follow up for TBSE in 6 months \par no visible neoplasm near Mohs site;  may use HC ointment prn for seb derm\par \par LN2 to AK L temple

## 2022-01-24 ENCOUNTER — APPOINTMENT (OUTPATIENT)
Dept: FAMILY MEDICINE | Facility: CLINIC | Age: 82
End: 2022-01-24
Payer: MEDICARE

## 2022-01-24 VITALS
HEIGHT: 68 IN | WEIGHT: 208 LBS | SYSTOLIC BLOOD PRESSURE: 132 MMHG | TEMPERATURE: 97.3 F | OXYGEN SATURATION: 96 % | HEART RATE: 85 BPM | DIASTOLIC BLOOD PRESSURE: 86 MMHG | BODY MASS INDEX: 31.52 KG/M2

## 2022-01-24 PROCEDURE — 36415 COLL VENOUS BLD VENIPUNCTURE: CPT

## 2022-01-24 PROCEDURE — G0439: CPT

## 2022-01-24 PROCEDURE — G0444 DEPRESSION SCREEN ANNUAL: CPT

## 2022-01-24 RX ORDER — CEFUROXIME AXETIL 250 MG/1
250 TABLET ORAL
Qty: 20 | Refills: 0 | Status: DISCONTINUED | COMMUNITY
Start: 2021-08-07

## 2022-01-24 RX ORDER — AMLODIPINE BESYLATE 10 MG/1
10 TABLET ORAL
Qty: 30 | Refills: 0 | Status: DISCONTINUED | COMMUNITY
Start: 2021-11-04

## 2022-01-24 NOTE — PLAN
[FreeTextEntry1] : Reviewed age-appropriate preventive screening tests with patient. UTD on colonoscopy and further screening not needed unless she wishes to do one more screen prior to age 85 yrs and she defers. She also prefers to defer on routine mammo and gyn exams which is reasonable for her to do at this stage of life; if she has any issues she will let me know. She is due for DEXA and she prefers to defer on this for now and will let me know if ever wants Rx for DEXA. \par \par Had oatmeal with blueberries and also a banana and water this AM. Check labs today. \par \par Continue same meds/doses. BP goal is <=135/85 on avg on home checks but ok for her to not check BP regularly since this causes her signif anxiety to do so (she will check BP if ever not feeling well) \par \par Discussed clean eating (eg Mediterranean style eating plan) and regular exercise/staying as physically active as possible. Revd concepts of motion is lotion and move more, sit less. Hydrate well. Include balance exercises and core strengthening and strength training exercises for bone health and to decrease fall risk.\par \par Reviewed importance of good self care (eg meditation, yoga, adequate rest, regular exercise, magnesium, clean eating etc). Revd r/b/se Alprazolam and that it is not recommended generally at this stage of life but as med is used infreq and still well tolerated and effective for her ok for her to cont to keep on hand for now. She will d/c med if at any point she has any SE and she will cont to use only sparingly. \par \par Next CPE in 1 yr. RPA visit (chol/HTN etc) in 6 months and will check fasting/semi fasting labs at that time.

## 2022-01-24 NOTE — REVIEW OF SYSTEMS
[Joint Pain] : joint pain [Joint Stiffness] : joint stiffness [Anxiety] : anxiety [Chest Pain] : no chest pain [Palpitations] : no palpitations [Lower Ext Edema] : no lower extremity edema [Skin Rash] : no skin rash [Depression] : no depression [Negative] : Neurological [FreeTextEntry9] : OA related joint pain and stiffness, doing PT and also working with  which helps [de-identified] : h/o multiple skin cancers (non melanoma) and she is followed closely by derm with regular skin checks [de-identified] : occas situational anxiety, see HPI

## 2022-01-24 NOTE — PHYSICAL EXAM
[No Acute Distress] : no acute distress [Well Developed] : well developed [Well-Appearing] : well-appearing [Normal Sclera/Conjunctiva] : normal sclera/conjunctiva [EOMI] : extraocular movements intact [No JVD] : no jugular venous distention [No Lymphadenopathy] : no lymphadenopathy [Supple] : supple [Thyroid Normal, No Nodules] : the thyroid was normal and there were no nodules present [No Respiratory Distress] : no respiratory distress  [No Accessory Muscle Use] : no accessory muscle use [Clear to Auscultation] : lungs were clear to auscultation bilaterally [Regular Rhythm] : with a regular rhythm [Normal S1, S2] : normal S1 and S2 [No Carotid Bruits] : no carotid bruits [No Varicosities] : no varicosities [Pedal Pulses Present] : the pedal pulses are present [No Edema] : there was no peripheral edema [No Extremity Clubbing/Cyanosis] : no extremity clubbing/cyanosis [Soft] : abdomen soft [Non Tender] : non-tender [Non-distended] : non-distended [No Masses] : no abdominal mass palpated [No HSM] : no HSM [Normal Bowel Sounds] : normal bowel sounds [No Joint Swelling] : no joint swelling [Grossly Normal Strength/Tone] : grossly normal strength/tone [No Rash] : no rash [Coordination Grossly Intact] : coordination grossly intact [No Focal Deficits] : no focal deficits [Normal Gait] : normal gait [Normal Affect] : the affect was normal [Normal Insight/Judgement] : insight and judgment were intact [Normal Posterior Cervical Nodes] : no posterior cervical lymphadenopathy [Normal Anterior Cervical Nodes] : no anterior cervical lymphadenopathy [de-identified] : mildly obese, she looks very well today, bright affect [de-identified] : 1/6 soft systolic murmur heard along LSB, no ectopy heard [de-identified] : +sclerotic joints B hands/wrists/knees etc but no s/sxs acute synovitis [de-identified] : +signif UV damage noted to skin

## 2022-01-24 NOTE — ASSESSMENT
[FreeTextEntry1] : ALONDRA LOUIS is a 81 year old female here for a physical exam.  She is also here to follow up on medical issues as noted above.\par

## 2022-01-24 NOTE — HEALTH RISK ASSESSMENT
[0] : 2) Feeling down, depressed, or hopeless: Not at all (0) [PHQ-2 Negative - No further assessment needed] : PHQ-2 Negative - No further assessment needed [[id number]] : 0

## 2022-01-24 NOTE — HISTORY OF PRESENT ILLNESS
[de-identified] : Her last PE was 2/2020\par \par Her last tetanus shot was 2018\par Pneumovax 7/2021, Prevnar  2019\par Shingrix 10/2020, 12/2020 \par Has had COVID series and booster Moderna dose. \par She also had her flu vacc this season. \par \par Her last dentist visit was within past 6 months\par Her last eye doctor appointment was within past year (Dr. Parks)\par Her last dermatologist visit was last week with Dr. Garcia-- had AK frozen but all was otherwise stable with her skin\par \par Her diet is clean/healthful overall\par Exercises regularly--  with balance and strength training and also does walking \par \par Her last colonoscopy was 6/2015 wnl, Dr. Camilo, further screening not indicated at this stage of her life\par Her last mammogram was 2019, she defers on further screening at this stage of her life\par Her last DEXA was years ago, showed osteopenia\par Her last gyn exam was years ago, she defers on further routine screening at this stage of her life\par \par Lawanda also has h/o hypercholesterolemia and hypertension and impaired fasting glucose and OA and syncope/bradycardia (see prior notes for details) and anxiety. \par \par She feels well without any syncope/near syncope or CP or palps or SOB since her hosp d/c 11/2021. Trying to hydrate better. She is UTD on f/u with Dr. Schmidt and her remote monitoring has not shown any bradycardia since she has been off beta blocker med. BPs have been well controlled on current regimen. \par \par She had swelling around ankles on 10 mg amlodipine and so she reduced dose to 5 mg dose and the edema resolved complete. Dr. Schmidt and he told her that her heart is strong and she does not have a heart problem and can f/u with him prn. He advised her no need to check BPs as this causes her undue anxiety.\par \par Seeing Dr. Valverde for therapy due to anxiety (largely fear surrounding being in large open spaces as this triggers anxiety that she will have syncope attack) and this was helpful. Dr. Valverde said she can f/u prn at this point.  She gave a rx for Alprazolam for use prn only very sparingly. It is effective and well tolerated when needed. She is working on breathing techniques to help with anxiety. She is gradually reintroducing going out into the world and is doing well with this. Also restarted working with her  and added some walking \par \par Takes meds consistently and is tolerating them well. She has been taking folic acid for years on recommendation of her prior PMD (Dr. Lemon) but it is not clear why she is taking this. We have revd that it is not clear that this is a med she needs to continue long term. Her old records do not clearly indicate reason for use of folic acid. We have Revd pros/cons of folic acid supplementation at this stage of life. She prefers to cont folic acid for now as is well tolerated\par \par Joint pain and stiffness is reasonably well controlled with keeping as physically active as possible. \par \par She has occasional situational anxiety and when this occurs she takes 1/2 of a 0.25 mg Alprazolam with good effect and med is well tolerated so she likes to keep it on hand for prn use. See above as well.

## 2022-01-25 ENCOUNTER — NON-APPOINTMENT (OUTPATIENT)
Age: 82
End: 2022-01-25

## 2022-01-25 LAB
ALBUMIN SERPL ELPH-MCNC: 4.2 G/DL
ALP BLD-CCNC: 92 U/L
ALT SERPL-CCNC: 9 U/L
ANION GAP SERPL CALC-SCNC: 11 MMOL/L
AST SERPL-CCNC: 15 U/L
BILIRUB SERPL-MCNC: 0.3 MG/DL
BUN SERPL-MCNC: 17 MG/DL
CALCIUM SERPL-MCNC: 9.4 MG/DL
CHLORIDE SERPL-SCNC: 103 MMOL/L
CHOLEST SERPL-MCNC: 152 MG/DL
CO2 SERPL-SCNC: 26 MMOL/L
CREAT SERPL-MCNC: 1.01 MG/DL
ESTIMATED AVERAGE GLUCOSE: 120 MG/DL
GLUCOSE SERPL-MCNC: 108 MG/DL
HBA1C MFR BLD HPLC: 5.8 %
HDLC SERPL-MCNC: 56 MG/DL
LDLC SERPL CALC-MCNC: 70 MG/DL
NONHDLC SERPL-MCNC: 95 MG/DL
POTASSIUM SERPL-SCNC: 4.8 MMOL/L
PROT SERPL-MCNC: 6.9 G/DL
SODIUM SERPL-SCNC: 141 MMOL/L
TRIGL SERPL-MCNC: 126 MG/DL
TSH SERPL-ACNC: 2.52 UIU/ML

## 2022-02-18 ENCOUNTER — APPOINTMENT (OUTPATIENT)
Dept: DERMATOLOGY | Facility: CLINIC | Age: 82
End: 2022-02-18
Payer: MEDICARE

## 2022-02-18 DIAGNOSIS — L82.0 INFLAMED SEBORRHEIC KERATOSIS: ICD-10-CM

## 2022-02-18 DIAGNOSIS — L21.9 SEBORRHEIC DERMATITIS, UNSPECIFIED: ICD-10-CM

## 2022-02-18 PROCEDURE — 99213 OFFICE O/P EST LOW 20 MIN: CPT | Mod: 25

## 2022-02-18 PROCEDURE — 17110 DESTRUCTION B9 LES UP TO 14: CPT

## 2022-02-18 RX ORDER — FLUTICASONE PROPIONATE 0.05 MG/G
0.01 OINTMENT TOPICAL
Qty: 1 | Refills: 1 | Status: DISCONTINUED | COMMUNITY
Start: 2021-02-03 | End: 2022-02-18

## 2022-02-18 NOTE — ASSESSMENT
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

## 2022-02-18 NOTE — HISTORY OF PRESENT ILLNESS
[de-identified] : f/u for check of nose area;  used OTC HC ointment, did much better;\par also:  c/o spot on R arm

## 2022-02-18 NOTE — PHYSICAL EXAM
[FreeTextEntry3] : L nose;  healed flap scar;  no residual lesion or dermatitis; \par \par + inflamed, waxy, keratotic papules; R upper arm; (2)\par

## 2022-07-01 ENCOUNTER — APPOINTMENT (OUTPATIENT)
Dept: DERMATOLOGY | Facility: CLINIC | Age: 82
End: 2022-07-01

## 2022-07-01 PROCEDURE — 11102 TANGNTL BX SKIN SINGLE LES: CPT

## 2022-07-01 PROCEDURE — 99213 OFFICE O/P EST LOW 20 MIN: CPT | Mod: 25

## 2022-07-01 RX ORDER — FLUOROURACIL 50 MG/G
5 CREAM TOPICAL
Qty: 1 | Refills: 2 | Status: ACTIVE | COMMUNITY
Start: 2018-08-13 | End: 1900-01-01

## 2022-07-01 NOTE — PHYSICAL EXAM
[Full Body Skin Exam Performed] : performed [FreeTextEntry3] : Skin examination performed of the face, neck, trunk, arms, legs; \par The patient is well, alert and oriented, pleasant and cooperative.\par Eyelids, conjunctivae, oral mucosa, digits and nails all normal.  \par No cervical adenopathy.\par \par Normal findings include:\par \par Seborrheic keratoses\par Angiomas\par Lentigines\par + DSAP; UEs, LEs; \par \par healed scar; L chin, R paranasal\par \par Healed flap scar L paranasal - with inflamed seborrheic scale in alar crease\par scaling erythematous papule; Right upper chest;\par

## 2022-07-01 NOTE — ASSESSMENT
[FreeTextEntry1] : Therapeutic options and their risks and benefits; along with multiple diagnostic possibilities were discussed at length; risks and benefits of further study were discussed;\par \par The patient was instructed to check portal and/or call the office in one week for biopsy results.\par \par Plan to treat by D&C if the biopsy is positive. R/o BCC R upper chest\par \par Recent Mohs L nose healed very well\par \par Continue regular exams;  Follow up for TBSE in 6 months \par Renew 5FU for DSAP

## 2022-07-01 NOTE — HISTORY OF PRESENT ILLNESS
[de-identified] : Pt. presents for skin check;\par c/o few spots of concern;  recent Mohs L nose; 3/2021-\par Severity:  mild  \par Modifying factors:  none\par Associated symptoms:  none\par Context:  no association with activity\par \par Recent BCCs on R chin, R paranasal

## 2022-07-12 ENCOUNTER — APPOINTMENT (OUTPATIENT)
Dept: FAMILY MEDICINE | Facility: CLINIC | Age: 82
End: 2022-07-12

## 2022-07-12 PROCEDURE — 36415 COLL VENOUS BLD VENIPUNCTURE: CPT

## 2022-07-15 LAB
ALBUMIN SERPL ELPH-MCNC: 4.4 G/DL
ALP BLD-CCNC: 95 U/L
ALT SERPL-CCNC: 10 U/L
ANION GAP SERPL CALC-SCNC: 10 MMOL/L
AST SERPL-CCNC: 18 U/L
BILIRUB SERPL-MCNC: 0.6 MG/DL
BUN SERPL-MCNC: 20 MG/DL
CALCIUM SERPL-MCNC: 9.1 MG/DL
CHLORIDE SERPL-SCNC: 105 MMOL/L
CHOLEST SERPL-MCNC: 141 MG/DL
CO2 SERPL-SCNC: 27 MMOL/L
CREAT SERPL-MCNC: 0.98 MG/DL
EGFR: 58 ML/MIN/1.73M2
ESTIMATED AVERAGE GLUCOSE: 114 MG/DL
GLUCOSE SERPL-MCNC: 100 MG/DL
HBA1C MFR BLD HPLC: 5.6 %
HDLC SERPL-MCNC: 60 MG/DL
LDLC SERPL CALC-MCNC: 68 MG/DL
NONHDLC SERPL-MCNC: 81 MG/DL
POTASSIUM SERPL-SCNC: 4.8 MMOL/L
PROT SERPL-MCNC: 6.5 G/DL
SODIUM SERPL-SCNC: 141 MMOL/L
TRIGL SERPL-MCNC: 67 MG/DL

## 2022-07-18 ENCOUNTER — APPOINTMENT (OUTPATIENT)
Dept: FAMILY MEDICINE | Facility: CLINIC | Age: 82
End: 2022-07-18

## 2022-07-18 VITALS
DIASTOLIC BLOOD PRESSURE: 82 MMHG | WEIGHT: 211 LBS | HEIGHT: 68 IN | HEART RATE: 82 BPM | BODY MASS INDEX: 31.98 KG/M2 | SYSTOLIC BLOOD PRESSURE: 126 MMHG | OXYGEN SATURATION: 97 % | TEMPERATURE: 97.2 F

## 2022-07-18 PROCEDURE — 36415 COLL VENOUS BLD VENIPUNCTURE: CPT

## 2022-07-18 PROCEDURE — 99214 OFFICE O/P EST MOD 30 MIN: CPT | Mod: 25

## 2022-07-18 RX ORDER — ALPRAZOLAM 0.25 MG/1
0.25 TABLET ORAL
Qty: 30 | Refills: 0 | Status: ACTIVE | COMMUNITY
Start: 2018-06-28 | End: 1900-01-01

## 2022-07-18 NOTE — HISTORY OF PRESENT ILLNESS
[FreeTextEntry1] : ALONDRA LOUIS is a 82 year old female here for a follow up visit.\par  [de-identified] : Lawanda has h/o hypercholesterolemia and hypertension and impaired fasting glucose and OA and mildl chronic kidney disease and syncope/bradycardia (see prior notes for details) and anxiety. \par \par She feels well without any syncope/near syncope or CP or palps or SOB since her hosp d/c 11/2021. Trying to hydrate better (approx 48 oz per day as more than that causes too many bathroom trips etc). She is UTD on f/u with Dr. Schmidt and her remote monitoring has not shown any bradycardia since she has been off beta blocker med. BPs have been well controlled on current regimen. \par \par She had swelling around ankles on 10 mg amlodipine and so she reduced dose to 5 mg dose and the edema resolved complete. Dr. Schmidt and he told her that her heart is strong and she does not have a heart problem and can f/u with him prn and does not require regular card f/u. He advised her no need to check BPs as this causes her undue anxiety. She avoids NSAIDs and is trying to hydrate well, GFR in lower to mid 50s and stable over last couple of checks. \par \par Seeing Dr. Valverde for therapy due to anxiety (largely fear surrounding being in large open spaces as this triggers anxiety that she will have syncope attack) and this was helpful. Dr. Valverde said she can f/u prn at this point. She gave a rx for Alprazolam for use prn only very sparingly but she would like the Rx for Alprazolam to be in my name so requests RF be sent today and she will cont to liit use to rare/occas only. It is effective and well tolerated when needed. She is working on breathing techniques to help with anxiety. She is gradually reintroducing going out into the world and is doing well with this overall though [facility] [place] [facility] and [place] as is a larger open space. Is working with her  at home and walking regularly\par \ирина Takes meds consistently and is tolerating them well. She has been taking folic acid for years on recommendation of her prior PMD (Dr. Lemon) but it is not clear why she is taking this. We have revd that it is not clear that this is a med she needs to continue long term. Her old records do not clearly indicate reason for use of folic acid. We have Revd pros/cons of folic acid supplementation at this stage of life. She prefers to cont folic acid for now as is well tolerated\par \par Joint pain and stiffness is reasonably well controlled with keeping as physically active as possible. \par \par She has occasional situational anxiety and when this occurs she takes 1/2 of a 0.25 mg Alprazolam with good effect and med is well tolerated so she likes to keep it on hand for prn use. See above as well. \par \par She is eating more cleanly (cut back on portion sizes and sugar/WF intake) and is trying to move more but has room to increase exercise levels. Has lost some wgt over the past year with these efforts

## 2022-07-18 NOTE — PHYSICAL EXAM
[No Acute Distress] : no acute distress [Well Developed] : well developed [Well-Appearing] : well-appearing [Normal Sclera/Conjunctiva] : normal sclera/conjunctiva [EOMI] : extraocular movements intact [No JVD] : no jugular venous distention [No Lymphadenopathy] : no lymphadenopathy [Supple] : supple [Thyroid Normal, No Nodules] : the thyroid was normal and there were no nodules present [No Respiratory Distress] : no respiratory distress  [No Accessory Muscle Use] : no accessory muscle use [Clear to Auscultation] : lungs were clear to auscultation bilaterally [Regular Rhythm] : with a regular rhythm [Normal S1, S2] : normal S1 and S2 [No Varicosities] : no varicosities [No Carotid Bruits] : no carotid bruits [Pedal Pulses Present] : the pedal pulses are present [No Edema] : there was no peripheral edema [No Extremity Clubbing/Cyanosis] : no extremity clubbing/cyanosis [Soft] : abdomen soft [Non Tender] : non-tender [Non-distended] : non-distended [No Masses] : no abdominal mass palpated [No HSM] : no HSM [Normal Bowel Sounds] : normal bowel sounds [Normal Posterior Cervical Nodes] : no posterior cervical lymphadenopathy [Normal Anterior Cervical Nodes] : no anterior cervical lymphadenopathy [No Joint Swelling] : no joint swelling [Grossly Normal Strength/Tone] : grossly normal strength/tone [No Rash] : no rash [Coordination Grossly Intact] : coordination grossly intact [No Focal Deficits] : no focal deficits [Normal Gait] : normal gait [Normal Affect] : the affect was normal [Normal Insight/Judgement] : insight and judgment were intact [de-identified] : mildly obese but wgt is down 4# since 12/2021, she looks very well today, bright affect [de-identified] : 1/6 soft systolic murmur heard along LSB, no ectopy heard [de-identified] : +sclerotic joints B hands/wrists/knees etc but no s/sxs acute synovitis [de-identified] : +signif UV damage noted to skin

## 2022-07-18 NOTE — REVIEW OF SYSTEMS
[Joint Pain] : joint pain [Joint Stiffness] : joint stiffness [Anxiety] : anxiety [Negative] : Heme/Lymph [Recent Change In Weight] : ~T recent weight change [Fever] : no fever [Chills] : no chills [Fatigue] : no fatigue [Chest Pain] : no chest pain [Palpitations] : no palpitations [Lower Ext Edema] : no lower extremity edema [Skin Rash] : no skin rash [Depression] : no depression [FreeTextEntry2] : purposeful wgt loss with  eating/mindful portions and incr exercise [FreeTextEntry9] : OA related joint pain and stiffness, doing PT and also working with  which helps [de-identified] : h/o multiple skin cancers (non melanoma) and she is followed closely by derm with regular skin checks [de-identified] : occas situational anxiety, see HPI

## 2022-07-18 NOTE — PLAN
[FreeTextEntry1] : Labs done prior to visit reviewed with Lawanda today. A1C improved from 5.8 at last check to 5.6% this time. Glucose improved from 108 to now 100. Creat improved from 1.01 to 0.98. Remainder of labs are wnl. Recommend she continue to eat as cleanly as possible and hydrate very well and limit/avoid NSAIDs. \par \par Continue same meds/doses. BP goal is <=135/85 on avg on home checks but ok for her to not check BP regularly since this causes her signif anxiety to do so (she will check BP if ever not feeling well) \par \par BP goal is <=135/85 on average on home checks. Advised to let us know if BPs are above goal on home checks. Hydrate well. Avoid NSAIDs. As long as renal function remains relatively stable (as is the case at this time) it is ok to defer on nephrology consultation but revd if renal function worsens over time I recommend she see nephrologist for further eval. \par \par LDL goal <=100 ideally. Reviewed risks/benefits of statin med. Recommended excellent hydration +/- co Q10 (100-400 mg daily) to decrease risk for statin related myalgias. \par \par Reviewed clean eating (eg Mediterranean style eating plan) and regular exercise/staying as physically active as possible. Revd concepts of motion is lotion and move more, sit less. Hydrate well. Include balance exercises and core strengthening and strength training exercises for bone health and to decrease fall risk.\par \par Reviewed importance of good self care (eg meditation, yoga, adequate rest, regular exercise, magnesium, clean eating etc). Revd r/b/se Alprazolam and that it is not recommended generally at this stage of life but as med is used infreq and still well tolerated and effective for her ok for her to cont to keep on hand for now. She will d/c med if at any point she has any SE and she will cont to use only sparingly. \par \par Next CPE and RPA visit (chol/HTN etc) in 6 months and will check fasting/semi fasting labs at that time.

## 2022-07-18 NOTE — ASSESSMENT
[FreeTextEntry1] : ALONDRA LOUIS is a 82 year old female here for follow up on medical issues as noted above.\par

## 2022-07-18 NOTE — HEALTH RISK ASSESSMENT
[0] : 2) Feeling down, depressed, or hopeless: Not at all (0) [PHQ-2 Negative - No further assessment needed] : PHQ-2 Negative - No further assessment needed [[id number]] : 0

## 2022-07-25 LAB — CORE LAB BIOPSY: NORMAL

## 2022-08-02 ENCOUNTER — APPOINTMENT (OUTPATIENT)
Dept: DERMATOLOGY | Facility: CLINIC | Age: 82
End: 2022-08-02

## 2022-08-02 PROCEDURE — 17262 DSTRJ MAL LES T/A/L 1.1-2.0: CPT

## 2023-02-03 ENCOUNTER — APPOINTMENT (OUTPATIENT)
Dept: DERMATOLOGY | Facility: CLINIC | Age: 83
End: 2023-02-03
Payer: MEDICARE

## 2023-02-03 DIAGNOSIS — D48.9 NEOPLASM OF UNCERTAIN BEHAVIOR, UNSPECIFIED: ICD-10-CM

## 2023-02-03 DIAGNOSIS — C44.612 BASAL CELL CARCINOMA OF SKIN OF RIGHT UPPER LIMB, INCLUDING SHOULDER: ICD-10-CM

## 2023-02-03 PROCEDURE — 11102 TANGNTL BX SKIN SINGLE LES: CPT

## 2023-02-03 PROCEDURE — 99213 OFFICE O/P EST LOW 20 MIN: CPT | Mod: 25

## 2023-02-03 NOTE — PHYSICAL EXAM
[Full Body Skin Exam Performed] : performed [FreeTextEntry3] : Skin examination performed of the face, neck, trunk, arms, legs; \par The patient is well, alert and oriented, pleasant and cooperative.\par Eyelids, conjunctivae, oral mucosa, digits and nails all normal.  \par No cervical adenopathy.\par \par Normal findings include:\par \par Seborrheic keratoses\par Angiomas\par Lentigines\par + DSAP; UEs, LEs; \par \par healed scar; L chin, R paranasal\par \par Healed flap scar L paranasal - with inflamed seborrheic scale in alar crease\par healed D&C site Right upper chest;\par \par pearly papule with telangiectasiae right upper eyelid just under brow

## 2023-02-03 NOTE — HISTORY OF PRESENT ILLNESS
[de-identified] : Pt. presents for skin check;\par c/o few spots of concern;  recent Mohs L nose; 3/2021-\par Severity:  mild  \par Modifying factors:  none\par Associated symptoms:  none\par Context:  no association with activity\par \par Recent BCCs on R upper chest,  R chin, R paranasal

## 2023-02-03 NOTE — ASSESSMENT
[FreeTextEntry1] : Therapeutic options and their risks and benefits; along with multiple diagnostic possibilities were discussed at length; risks and benefits of further study were discussed;\par \par The patient was instructed to check portal and/or call the office in one week for biopsy results.\par  \par Plan to treat by D&C if the biopsy is positive. \par \par Recent Mohs L nose healed very well\par \par Continue regular exams;  Follow up for TBSE in 6 months \par Cont prn 5FU for DSAP

## 2023-02-12 ENCOUNTER — NON-APPOINTMENT (OUTPATIENT)
Age: 83
End: 2023-02-12

## 2023-02-16 LAB — CORE LAB BIOPSY: NORMAL

## 2023-03-10 ENCOUNTER — APPOINTMENT (OUTPATIENT)
Dept: FAMILY MEDICINE | Facility: CLINIC | Age: 83
End: 2023-03-10
Payer: MEDICARE

## 2023-03-10 PROCEDURE — 36415 COLL VENOUS BLD VENIPUNCTURE: CPT

## 2023-03-12 LAB
ALBUMIN SERPL ELPH-MCNC: 4.2 G/DL
ALP BLD-CCNC: 85 U/L
ALT SERPL-CCNC: 12 U/L
ANION GAP SERPL CALC-SCNC: 10 MMOL/L
AST SERPL-CCNC: 15 U/L
BILIRUB SERPL-MCNC: 0.4 MG/DL
BUN SERPL-MCNC: 18 MG/DL
CALCIUM SERPL-MCNC: 9.3 MG/DL
CHLORIDE SERPL-SCNC: 106 MMOL/L
CHOLEST SERPL-MCNC: 147 MG/DL
CO2 SERPL-SCNC: 26 MMOL/L
CREAT SERPL-MCNC: 0.97 MG/DL
EGFR: 58 ML/MIN/1.73M2
ESTIMATED AVERAGE GLUCOSE: 120 MG/DL
GLUCOSE SERPL-MCNC: 103 MG/DL
HBA1C MFR BLD HPLC: 5.8 %
HDLC SERPL-MCNC: 61 MG/DL
LDLC SERPL CALC-MCNC: 71 MG/DL
NONHDLC SERPL-MCNC: 86 MG/DL
POTASSIUM SERPL-SCNC: 5 MMOL/L
PROT SERPL-MCNC: 6.6 G/DL
SODIUM SERPL-SCNC: 142 MMOL/L
TRIGL SERPL-MCNC: 74 MG/DL

## 2023-03-15 ENCOUNTER — APPOINTMENT (OUTPATIENT)
Dept: FAMILY MEDICINE | Facility: CLINIC | Age: 83
End: 2023-03-15
Payer: MEDICARE

## 2023-03-15 VITALS
BODY MASS INDEX: 33.34 KG/M2 | HEART RATE: 68 BPM | WEIGHT: 220 LBS | HEIGHT: 68 IN | DIASTOLIC BLOOD PRESSURE: 74 MMHG | OXYGEN SATURATION: 99 % | TEMPERATURE: 97.9 F | SYSTOLIC BLOOD PRESSURE: 108 MMHG

## 2023-03-15 DIAGNOSIS — R00.1 BRADYCARDIA, UNSPECIFIED: ICD-10-CM

## 2023-03-15 DIAGNOSIS — Z00.00 ENCOUNTER FOR GENERAL ADULT MEDICAL EXAMINATION W/OUT ABNORMAL FINDINGS: ICD-10-CM

## 2023-03-15 PROCEDURE — G0439: CPT

## 2023-03-15 RX ORDER — IPRATROPIUM BROMIDE 42 UG/1
0.06 SPRAY NASAL
Qty: 15 | Refills: 0 | Status: ACTIVE | COMMUNITY
Start: 2023-02-13

## 2023-03-15 NOTE — REVIEW OF SYSTEMS
[Recent Change In Weight] : ~T recent weight change [Joint Pain] : joint pain [Joint Stiffness] : joint stiffness [Anxiety] : anxiety [Negative] : Heme/Lymph [Fever] : no fever [Chills] : no chills [Fatigue] : no fatigue [Chest Pain] : no chest pain [Palpitations] : no palpitations [Lower Ext Edema] : no lower extremity edema [Skin Rash] : no skin rash [Depression] : no depression [FreeTextEntry2] : some wgt gain in past 6+ mos, is working to watch portions/eat cleanly and keep as physically active as possible [FreeTextEntry9] : OA related joint pain and stiffness, exercising regularly helps to manage joint sxs well   [de-identified] : h/o multiple skin cancers (non melanoma) and she is followed closely by derm with regular skin checks, had recent biopsy R upper eyelid and fortunately was benign  [de-identified] : occas situational anxiety, see HPI

## 2023-03-15 NOTE — HEALTH RISK ASSESSMENT
[No falls in past year] : Patient reported no falls in the past year [0] : 2) Feeling down, depressed, or hopeless: Not at all (0) [PHQ-2 Negative - No further assessment needed] : PHQ-2 Negative - No further assessment needed [Never] : Never [[id number]] : 0

## 2023-03-15 NOTE — PHYSICAL EXAM
[No Acute Distress] : no acute distress [Well Developed] : well developed [Well-Appearing] : well-appearing [Normal Sclera/Conjunctiva] : normal sclera/conjunctiva [EOMI] : extraocular movements intact [No JVD] : no jugular venous distention [No Lymphadenopathy] : no lymphadenopathy [Supple] : supple [Thyroid Normal, No Nodules] : the thyroid was normal and there were no nodules present [No Respiratory Distress] : no respiratory distress  [No Accessory Muscle Use] : no accessory muscle use [Clear to Auscultation] : lungs were clear to auscultation bilaterally [Regular Rhythm] : with a regular rhythm [Normal S1, S2] : normal S1 and S2 [No Carotid Bruits] : no carotid bruits [No Varicosities] : no varicosities [Pedal Pulses Present] : the pedal pulses are present [No Edema] : there was no peripheral edema [No Extremity Clubbing/Cyanosis] : no extremity clubbing/cyanosis [Soft] : abdomen soft [Non Tender] : non-tender [Non-distended] : non-distended [No Masses] : no abdominal mass palpated [No HSM] : no HSM [Normal Bowel Sounds] : normal bowel sounds [No Joint Swelling] : no joint swelling [Grossly Normal Strength/Tone] : grossly normal strength/tone [No Rash] : no rash [Coordination Grossly Intact] : coordination grossly intact [No Focal Deficits] : no focal deficits [Normal Gait] : normal gait [Normal Affect] : the affect was normal [Normal Insight/Judgement] : insight and judgment were intact [de-identified] : mildly obese BMI 33 [de-identified] : 1/6 soft systolic murmur heard along LSB, no ectopy heard [de-identified] : +sclerotic joints B hands/wrists/knees etc but no s/sxs acute synovitis [de-identified] : +signif UV damage noted to skin

## 2023-03-15 NOTE — ASSESSMENT
[FreeTextEntry1] : ALONDRA LOUIS is a 83 year old female here for a physical exam.  She is also here to follow up on medical issues as noted above.\par \ирина Webber has a h/o hypercholesterolemia, hypertension, impaired fasting glucose, OA, syncope/bradycardia, and anxiety, and CKD (stable from priro and I suspect is largely due to suboptimal hydration at times of fasting labs and she notes she had no water on AM of lab test; she will try to drink more water prior to next labs to get better sense of true baseline kidney function) .

## 2023-03-15 NOTE — HISTORY OF PRESENT ILLNESS
[de-identified] : Her last physical exam was last year\par \par Vaccines:\par Tetanus is up to date; last 2018\par Pneumococcal vaccination is up to date\par Shingrix is up to date\par COVID vaccine is up to date\par \par Her last dentist visit was less than one year ago\par Her last eye doctor appointment was less than one year ago, Dr. Parks \par Her last dermatologist visit was within past year (Dr. Garcia)\par \par Last GYN visit was years ago; she defers on further screening at this stage of her life\par Mammogram is no longer indicated at this stage of her life; last 2019\par Colon cancer screening is no longer indicated at this stage of her life; colonoscopy 6/2015 wnl (Dr. Camilo)\par DEXA is NOT up to date; last was years ago, showed osteopenia\par \par Her diet is healthy overall\par Exercise: does home balance and strength training and also does walking - regularly \par \par Lawanda has h/o hypercholesterolemia and hypertension and impaired fasting glucose and OA and mild chronic kidney disease and syncope/bradycardia (see prior notes for details) and anxiety. \par \par She is UTD on f/u with Dr. Schmidt\par \par She has occasional situational anxiety and when this occurs she takes 1/2 of a 0.25 mg Alprazolam with good effect and med is well tolerated so she likes to keep it on hand for prn use\par

## 2023-03-15 NOTE — PLAN
[FreeTextEntry1] : Labs done prior to visit reviewed with Lawanda today. All labs are normal or stable (elevated glu/A1c, low GFR). Recommend cont same meds/doses and cont/increase efforts with Mediterranean style eating and regular exercise/keeping as physically active as possible. \par \par Continue all medications as prescribed. \par \par Reviewed age-appropriate preventive screening tests with patient. Screening gyn exams/paps, mammograms and CRC screening no longer medically indicated at this stage of her life. She is due for DEXA and will schedule for near future. \par \par BP goal is <=135/85 on average on home checks. Advised to let us know if BPs are above goal on home checks. Hydrate well. Avoid NSAIDs. As long as renal function remains relatively stable (as is the case at this time) it is ok to defer on nephrology consultation but revd if renal function worsens over time I recommend she see nephrologist for further eval. \par \par LDL goal <=100 ideally. Reviewed risks/benefits of statin med. Recommended excellent hydration +/- co Q10 (100-400 mg daily) to decrease risk for statin related myalgias. \par \par Reviewed clean eating (eg Mediterranean style eating plan) and regular exercise/staying as physically active as possible. Revd concepts of motion is lotion and move more, sit less. Hydrate well. Include balance exercises and core strengthening and strength training exercises for bone health and to decrease fall risk.\par \par Reviewed importance of good self care (eg meditation, yoga, adequate rest, regular exercise, magnesium, clean eating etc).\par \par Revd r/b/se benzodiazepine meds and that these are not generally recommended for use in pts 65+ yrs of age due to increased risk for SE incl dizziness, incr risk for falls, cognitive effective. Since med is only occas/rarely used and is well tolerated and effective when needed ok to continue to keep on hand for now. If ever any SE pt will d/c med immediately. She does not yet need RF on this med and will let me know when she does \par \par Follow up for next physical in one year. Schedule RPA visit (chol/HTN etc) in about 6 months\par

## 2023-04-18 ENCOUNTER — NON-APPOINTMENT (OUTPATIENT)
Age: 83
End: 2023-04-18

## 2023-10-01 ENCOUNTER — NON-APPOINTMENT (OUTPATIENT)
Age: 83
End: 2023-10-01

## 2023-10-20 ENCOUNTER — APPOINTMENT (OUTPATIENT)
Dept: DERMATOLOGY | Facility: CLINIC | Age: 83
End: 2023-10-20
Payer: MEDICARE

## 2023-10-20 DIAGNOSIS — L82.1 OTHER SEBORRHEIC KERATOSIS: ICD-10-CM

## 2023-10-20 PROCEDURE — 99214 OFFICE O/P EST MOD 30 MIN: CPT

## 2023-10-25 ENCOUNTER — APPOINTMENT (OUTPATIENT)
Dept: FAMILY MEDICINE | Facility: CLINIC | Age: 83
End: 2023-10-25
Payer: MEDICARE

## 2023-10-25 PROCEDURE — 36415 COLL VENOUS BLD VENIPUNCTURE: CPT

## 2023-10-29 LAB
ALBUMIN SERPL ELPH-MCNC: 4.1 G/DL
ALP BLD-CCNC: 96 U/L
ALT SERPL-CCNC: 10 U/L
ANION GAP SERPL CALC-SCNC: 10 MMOL/L
AST SERPL-CCNC: 16 U/L
BILIRUB SERPL-MCNC: 0.5 MG/DL
BUN SERPL-MCNC: 17 MG/DL
CALCIUM SERPL-MCNC: 9.1 MG/DL
CHLORIDE SERPL-SCNC: 106 MMOL/L
CHOLEST SERPL-MCNC: 144 MG/DL
CO2 SERPL-SCNC: 26 MMOL/L
CREAT SERPL-MCNC: 0.91 MG/DL
EGFR: 63 ML/MIN/1.73M2
ESTIMATED AVERAGE GLUCOSE: 114 MG/DL
GLUCOSE SERPL-MCNC: 112 MG/DL
HBA1C MFR BLD HPLC: 5.6 %
HDLC SERPL-MCNC: 58 MG/DL
LDLC SERPL CALC-MCNC: 71 MG/DL
NONHDLC SERPL-MCNC: 86 MG/DL
POTASSIUM SERPL-SCNC: 4.3 MMOL/L
PROT SERPL-MCNC: 6.4 G/DL
SODIUM SERPL-SCNC: 142 MMOL/L
TRIGL SERPL-MCNC: 80 MG/DL

## 2023-11-02 ENCOUNTER — APPOINTMENT (OUTPATIENT)
Dept: OTOLARYNGOLOGY | Facility: CLINIC | Age: 83
End: 2023-11-02
Payer: MEDICARE

## 2023-11-02 VITALS — BODY MASS INDEX: 32.58 KG/M2 | WEIGHT: 215 LBS | HEIGHT: 68 IN

## 2023-11-02 DIAGNOSIS — H90.3 SENSORINEURAL HEARING LOSS, BILATERAL: ICD-10-CM

## 2023-11-02 PROCEDURE — 92557 COMPREHENSIVE HEARING TEST: CPT

## 2023-11-02 PROCEDURE — 92567 TYMPANOMETRY: CPT

## 2023-11-02 PROCEDURE — 99204 OFFICE O/P NEW MOD 45 MIN: CPT

## 2023-11-02 RX ORDER — DOXYCYCLINE 50 MG/1
50 CAPSULE ORAL
Qty: 20 | Refills: 0 | Status: ACTIVE | COMMUNITY
Start: 2023-06-06

## 2023-11-02 RX ORDER — CHLORHEXIDINE GLUCONATE, 0.12% ORAL RINSE 1.2 MG/ML
0.12 SOLUTION DENTAL
Qty: 473 | Refills: 0 | Status: ACTIVE | COMMUNITY
Start: 2023-08-10

## 2023-11-02 RX ORDER — IBUPROFEN 600 MG/1
600 TABLET, FILM COATED ORAL
Qty: 20 | Refills: 0 | Status: ACTIVE | COMMUNITY
Start: 2023-07-02

## 2023-11-03 ENCOUNTER — APPOINTMENT (OUTPATIENT)
Dept: FAMILY MEDICINE | Facility: CLINIC | Age: 83
End: 2023-11-03
Payer: MEDICARE

## 2023-11-03 VITALS
BODY MASS INDEX: 33.49 KG/M2 | DIASTOLIC BLOOD PRESSURE: 72 MMHG | TEMPERATURE: 97 F | HEART RATE: 71 BPM | HEIGHT: 68 IN | WEIGHT: 221 LBS | SYSTOLIC BLOOD PRESSURE: 108 MMHG

## 2023-11-03 DIAGNOSIS — I10 ESSENTIAL (PRIMARY) HYPERTENSION: ICD-10-CM

## 2023-11-03 DIAGNOSIS — E78.00 PURE HYPERCHOLESTEROLEMIA, UNSPECIFIED: ICD-10-CM

## 2023-11-03 DIAGNOSIS — M85.80 OTHER SPECIFIED DISORDERS OF BONE DENSITY AND STRUCTURE, UNSPECIFIED SITE: ICD-10-CM

## 2023-11-03 DIAGNOSIS — R73.01 IMPAIRED FASTING GLUCOSE: ICD-10-CM

## 2023-11-03 DIAGNOSIS — M15.9 POLYOSTEOARTHRITIS, UNSPECIFIED: ICD-10-CM

## 2023-11-03 DIAGNOSIS — F41.8 OTHER SPECIFIED ANXIETY DISORDERS: ICD-10-CM

## 2023-11-03 PROCEDURE — 99214 OFFICE O/P EST MOD 30 MIN: CPT

## 2023-11-03 RX ORDER — AMLODIPINE BESYLATE 5 MG/1
5 TABLET ORAL
Qty: 90 | Refills: 3 | Status: ACTIVE | COMMUNITY
Start: 2021-12-06 | End: 1900-01-01

## 2023-11-03 RX ORDER — LOSARTAN POTASSIUM 50 MG/1
50 TABLET, FILM COATED ORAL
Qty: 90 | Refills: 3 | Status: ACTIVE | COMMUNITY
Start: 2021-11-04 | End: 1900-01-01

## 2023-11-03 RX ORDER — LOVASTATIN 20 MG/1
20 TABLET ORAL
Qty: 90 | Refills: 3 | Status: ACTIVE | COMMUNITY
Start: 2018-06-18 | End: 1900-01-01

## 2024-02-08 ENCOUNTER — APPOINTMENT (OUTPATIENT)
Dept: DERMATOLOGY | Facility: CLINIC | Age: 84
End: 2024-02-08
Payer: MEDICARE

## 2024-02-08 DIAGNOSIS — L57.0 ACTINIC KERATOSIS: ICD-10-CM

## 2024-02-08 PROCEDURE — 17000 DESTRUCT PREMALG LESION: CPT

## 2024-02-08 PROCEDURE — 99213 OFFICE O/P EST LOW 20 MIN: CPT | Mod: 25

## 2024-02-08 NOTE — PHYSICAL EXAM
[FreeTextEntry3] : b/l arms: + lentigines and solar damage are present in sun exposed areas; scaling erythematous papules;   one inflamed keratotic papule R distal dorsal forearm

## 2024-02-08 NOTE — HISTORY OF PRESENT ILLNESS
[de-identified] : The patient has been fit in for urgent appointment.  c/o changing lesion on right forearm;  Hx BCC, SCC

## 2024-02-08 NOTE — ASSESSMENT
[FreeTextEntry1] : cryo to AK R forearm,  Hx multiple Skin CA, DSAP   Therapeutic options and their risks and benefits; along with multiple diagnostic possibilities were discussed at length; risks and benefits of further study were discussed;  f/u as scheduled for TBSE

## 2024-05-03 ENCOUNTER — APPOINTMENT (OUTPATIENT)
Dept: DERMATOLOGY | Facility: CLINIC | Age: 84
End: 2024-05-03
Payer: MEDICARE

## 2024-05-03 DIAGNOSIS — L81.4 OTHER MELANIN HYPERPIGMENTATION: ICD-10-CM

## 2024-05-03 DIAGNOSIS — L56.5 DISSEMINATED SUPERFICIAL ACTINIC POROKERATOSIS (DSAP): ICD-10-CM

## 2024-05-03 DIAGNOSIS — C44.310 BASAL CELL CARCINOMA OF SKIN OF UNSPECIFIED PARTS OF FACE: ICD-10-CM

## 2024-05-03 DIAGNOSIS — C44.311 BASAL CELL CARCINOMA OF SKIN OF NOSE: ICD-10-CM

## 2024-05-03 DIAGNOSIS — Z85.828 PERSONAL HISTORY OF OTHER MALIGNANT NEOPLASM OF SKIN: ICD-10-CM

## 2024-05-03 DIAGNOSIS — L30.9 DERMATITIS, UNSPECIFIED: ICD-10-CM

## 2024-05-03 PROCEDURE — 99214 OFFICE O/P EST MOD 30 MIN: CPT

## 2024-05-03 NOTE — PHYSICAL EXAM
[Full Body Skin Exam Performed] : performed [FreeTextEntry3] : Skin examination performed of the face, neck, trunk, arms, legs;  The patient is well, alert and oriented, pleasant and cooperative. Eyelids, conjunctivae, oral mucosa, digits and nails all normal.   No cervical adenopathy.  Normal findings include:  Seborrheic keratoses Angiomas Lentigines + DSAP; UEs, LEs;   Lower legs;  many lesions with lichenification, inflammatory changes  healed scar; L chin, R paranasal  Healed flap scar L paranasal - with inflamed seborrheic scale in alar crease healed D&C site Right upper chest;  No lesions suspicious for malignancy.

## 2024-05-03 NOTE — ASSESSMENT
[FreeTextEntry1] : Complete skin examination is negative for malignancy; Multiple new concerns were addressed and discussed. Therapeutic options and their risks and benefits; along with multiple diagnostic possibilities were discussed at length; risks and benefits of skin biopsy and/or other further study were discussed;  Continue regular exams; Follow up for TBSE in 6 months  Cont prn 5FU for DSAP.- Now with LSC type changes of lower leg lesions;  use OTC anti itch lotions, t/c Rx if persists  Recent Bx R brow: 2/2023- benign

## 2024-05-03 NOTE — HISTORY OF PRESENT ILLNESS
[de-identified] : Pt. presents for skin check; c/o few spots of concern;  recent Mohs L nose; 3/2021- Severity:  mild   Modifying factors:  none Associated symptoms:  none Context:  no association with activity Long standing DSAP of legs, c/o raised lesions recently  Recent BCCs on R upper chest,  R chin, R paranasal

## 2024-07-17 ENCOUNTER — APPOINTMENT (OUTPATIENT)
Dept: FAMILY MEDICINE | Facility: CLINIC | Age: 84
End: 2024-07-17
Payer: MEDICARE

## 2024-07-17 PROCEDURE — 36415 COLL VENOUS BLD VENIPUNCTURE: CPT

## 2024-07-19 DIAGNOSIS — R71.8 OTHER ABNORMALITY OF RED BLOOD CELLS: ICD-10-CM

## 2024-07-19 LAB
ALBUMIN SERPL ELPH-MCNC: 4.3 G/DL
ALP BLD-CCNC: 93 U/L
ALT SERPL-CCNC: 10 U/L
ANION GAP SERPL CALC-SCNC: 12 MMOL/L
BASOPHILS # BLD AUTO: 0.04 K/UL
BASOPHILS NFR BLD AUTO: 0.5 %
BILIRUB SERPL-MCNC: 0.4 MG/DL
BUN SERPL-MCNC: 17 MG/DL
CALCIUM SERPL-MCNC: 9.6 MG/DL
CHLORIDE SERPL-SCNC: 104 MMOL/L
CHOLEST SERPL-MCNC: 151 MG/DL
CO2 SERPL-SCNC: 25 MMOL/L
EGFR: 61 ML/MIN/1.73M2
EOSINOPHIL # BLD AUTO: 0.12 K/UL
EOSINOPHIL NFR BLD AUTO: 1.6 %
ESTIMATED AVERAGE GLUCOSE: 123 MG/DL
GLUCOSE SERPL-MCNC: 112 MG/DL
HBA1C MFR BLD HPLC: 5.9 %
HDLC SERPL-MCNC: 62 MG/DL
HGB BLD-MCNC: 13.7 G/DL
IMM GRANULOCYTES NFR BLD AUTO: 0.3 %
LDLC SERPL CALC-MCNC: 69 MG/DL
LYMPHOCYTES # BLD AUTO: 3.19 K/UL
LYMPHOCYTES NFR BLD AUTO: 43.5 %
MAN DIFF?: NORMAL
MCHC RBC-ENTMCNC: 29.7 GM/DL
MCHC RBC-ENTMCNC: 30.7 PG
MCV RBC AUTO: 103.4 FL
MONOCYTES # BLD AUTO: 0.57 K/UL
MONOCYTES NFR BLD AUTO: 7.8 %
NEUTROPHILS # BLD AUTO: 3.39 K/UL
NEUTROPHILS NFR BLD AUTO: 46.3 %
NONHDLC SERPL-MCNC: 88 MG/DL
PLATELET # BLD AUTO: 258 K/UL
POTASSIUM SERPL-SCNC: 4.1 MMOL/L
PROT SERPL-MCNC: 7.1 G/DL
RBC # BLD: 4.46 M/UL
RBC # FLD: 14.1 %
SODIUM SERPL-SCNC: 141 MMOL/L
TRIGL SERPL-MCNC: 109 MG/DL
TSH SERPL-ACNC: 1.98 UIU/ML
WBC # FLD AUTO: 7.33 K/UL

## 2024-07-21 LAB
FERRITIN SERPL-MCNC: 120 NG/ML
IRON SATN MFR SERPL: 24 %
IRON SERPL-MCNC: 86 UG/DL
TIBC SERPL-MCNC: 353 UG/DL
UIBC SERPL-MCNC: 268 UG/DL
VIT B12 SERPL-MCNC: 792 PG/ML

## 2024-07-24 ENCOUNTER — APPOINTMENT (OUTPATIENT)
Dept: FAMILY MEDICINE | Facility: CLINIC | Age: 84
End: 2024-07-24
Payer: MEDICARE

## 2024-07-24 ENCOUNTER — NON-APPOINTMENT (OUTPATIENT)
Age: 84
End: 2024-07-24

## 2024-07-24 VITALS
OXYGEN SATURATION: 97 % | HEIGHT: 68 IN | HEART RATE: 77 BPM | BODY MASS INDEX: 33.34 KG/M2 | WEIGHT: 220 LBS | DIASTOLIC BLOOD PRESSURE: 80 MMHG | SYSTOLIC BLOOD PRESSURE: 120 MMHG | TEMPERATURE: 97.5 F

## 2024-07-24 DIAGNOSIS — I10 ESSENTIAL (PRIMARY) HYPERTENSION: ICD-10-CM

## 2024-07-24 DIAGNOSIS — M15.9 POLYOSTEOARTHRITIS, UNSPECIFIED: ICD-10-CM

## 2024-07-24 DIAGNOSIS — M85.80 OTHER SPECIFIED DISORDERS OF BONE DENSITY AND STRUCTURE, UNSPECIFIED SITE: ICD-10-CM

## 2024-07-24 DIAGNOSIS — R73.01 IMPAIRED FASTING GLUCOSE: ICD-10-CM

## 2024-07-24 DIAGNOSIS — Z87.898 PERSONAL HISTORY OF OTHER SPECIFIED CONDITIONS: ICD-10-CM

## 2024-07-24 DIAGNOSIS — E78.00 PURE HYPERCHOLESTEROLEMIA, UNSPECIFIED: ICD-10-CM

## 2024-07-24 DIAGNOSIS — Z00.00 ENCOUNTER FOR GENERAL ADULT MEDICAL EXAMINATION W/OUT ABNORMAL FINDINGS: ICD-10-CM

## 2024-07-24 PROCEDURE — 93000 ELECTROCARDIOGRAM COMPLETE: CPT

## 2024-07-24 PROCEDURE — G0439: CPT

## 2024-07-24 NOTE — ASSESSMENT
[FreeTextEntry1] : LAWANDA LOUIS is a 84 year old female here for a physical exam.  She is also here to follow up on medical issues as noted above.  Lawanda has hypercholesterolemia and hypertension and impaired fasting glucose, osteopenia, OA and mild chronic kidney disease and h/o syncope/bradycardia

## 2024-07-24 NOTE — REVIEW OF SYSTEMS
[Fever] : no fever [Chills] : no chills [Fatigue] : no fatigue [Recent Change In Weight] : ~T recent weight change [Joint Pain] : joint pain [Joint Stiffness] : joint stiffness [Anxiety] : anxiety [FreeTextEntry2] : some wgt gain in past 6+ mos, is working to watch portions/eat cleanly and keep as physically active as possible [Negative] : Constitutional [Skin Rash] : no skin rash [Depression] : no depression [FreeTextEntry9] : OA related joint pain and stiffness, exercising regularly helps to manage joint sxs well   [de-identified] : h/o multiple skin cancers (non melanoma) and she is followed closely by derm with regular skin checks, had recent biopsy R upper eyelid and fortunately was benign  [de-identified] : occas situational anxiety, has alprzolam on hand for prn use but uses only rarely/occas

## 2024-07-24 NOTE — HISTORY OF PRESENT ILLNESS
[FreeTextEntry1] : ALONDRA LOUIS is a 84 year old female here for a physical exam. [de-identified] : Her last physical exam was last year  Vaccines: Tetanus is up to date, Tdap 2018 Pneumococcal vaccination is up to date Shingrix is up to date  Her last dentist visit was within past 6-12 months, Has a wisdom tooth L side which s causing some gum recession/erosion and may require extraction of this tooth. Will be meeting with her periodontist Dr. Dempsey 24 re if needs extraction. We revd that if she does should try to choose most minimal level anesthesia possible.  Her last eye doctor appointment was less than one year ago, Dr. Parks Her last dermatologist visit was less than one year ago, 2024 Dr. Garcia  Last GYN visit was years ago; she defers on further screening at this stage of her life Mammogram is no longer indicated at this stage of her life; last  Colon cancer screening is no longer indicated at this stage of her life; colonoscopy 2015 wnl (Dr. Camilo) DEXA is up to date, 2023  -1.7 fem neck lowest T score  Her diet is healthy overall-- Mediterranean based Exercise: some walking, balance and some strength training. Is doing PT for her back (saw PA at Dr. carmichael's office and PT was recommended and this is helping. Has not been quite as active as she was before due to a variety of reasons (hot weather, lost her  etc) and we disc and she will try to get back on track with higher activity levels again   Lawanda has hypercholesterolemia and hypertension and impaired fasting glucose and OA and mild chronic kidney disease and h/o syncope/bradycardia (see prior notes for details) and anxiety.  She last saw Dr. Schmidt in . Has been feeling well from cardiac standpoint without palpitations, CP, syncope/near-syncope or dizziness spells.   She has occasional situational anxiety and when this occurs she takes 1/2 of a 0.25 mg Alprazolam with good effect and med is well tolerated so she likes to keep it on hand for prn use. Last RF Summer 2022 and she has a couple of pills left. Would like RF since is here and those are

## 2024-07-24 NOTE — HEALTH RISK ASSESSMENT
[No falls in past year] : Patient reported no falls in the past year [0] : 2) Feeling down, depressed, or hopeless: Not at all (0) [PHQ-2 Negative - No further assessment needed] : PHQ-2 Negative - No further assessment needed [Never] : Never [[id number]] : 0

## 2024-07-24 NOTE — HISTORY OF PRESENT ILLNESS
[FreeTextEntry1] : ALONDRA LOUIS is a 84 year old female here for a physical exam. [de-identified] : Her last physical exam was last year  Vaccines: Tetanus is up to date, Tdap 2018 Pneumococcal vaccination is up to date Shingrix is up to date  Her last dentist visit was within past 6-12 months, Has a wisdom tooth L side which s causing some gum recession/erosion and may require extraction of this tooth. Will be meeting with her periodontist Dr. Dempsey 24 re if needs extraction. We revd that if she does should try to choose most minimal level anesthesia possible.  Her last eye doctor appointment was less than one year ago, Dr. Parks Her last dermatologist visit was less than one year ago, 2024 Dr. Garcia  Last GYN visit was years ago; she defers on further screening at this stage of her life Mammogram is no longer indicated at this stage of her life; last  Colon cancer screening is no longer indicated at this stage of her life; colonoscopy 2015 wnl (Dr. Camilo) DEXA is up to date, 2023  -1.7 fem neck lowest T score  Her diet is healthy overall-- Mediterranean based Exercise: some walking, balance and some strength training. Is doing PT for her back (saw PA at Dr. carmichael's office and PT was recommended and this is helping. Has not been quite as active as she was before due to a variety of reasons (hot weather, lost her  etc) and we disc and she will try to get back on track with higher activity levels again   Lawanda has hypercholesterolemia and hypertension and impaired fasting glucose and OA and mild chronic kidney disease and h/o syncope/bradycardia (see prior notes for details) and anxiety.  She last saw Dr. Schmidt in . Has been feeling well from cardiac standpoint without palpitations, CP, syncope/near-syncope or dizziness spells.   She has occasional situational anxiety and when this occurs she takes 1/2 of a 0.25 mg Alprazolam with good effect and med is well tolerated so she likes to keep it on hand for prn use. Last RF Summer 2022 and she has a couple of pills left. Would like RF since is here and those are

## 2024-07-24 NOTE — HEALTH RISK ASSESSMENT
[No falls in past year] : Patient reported no falls in the past year [0] : 2) Feeling down, depressed, or hopeless: Not at all (0) [PHQ-2 Negative - No further assessment needed] : PHQ-2 Negative - No further assessment needed [Never] : Never [[id number]] : 0

## 2024-07-24 NOTE — REVIEW OF SYSTEMS
[Fever] : no fever [Chills] : no chills [Fatigue] : no fatigue [Recent Change In Weight] : ~T recent weight change [Joint Pain] : joint pain [Joint Stiffness] : joint stiffness [Anxiety] : anxiety [FreeTextEntry2] : some wgt gain in past 6+ mos, is working to watch portions/eat cleanly and keep as physically active as possible [Negative] : Constitutional [Skin Rash] : no skin rash [Depression] : no depression [FreeTextEntry9] : OA related joint pain and stiffness, exercising regularly helps to manage joint sxs well   [de-identified] : h/o multiple skin cancers (non melanoma) and she is followed closely by derm with regular skin checks, had recent biopsy R upper eyelid and fortunately was benign  [de-identified] : occas situational anxiety, has alprzolam on hand for prn use but uses only rarely/occas

## 2024-07-24 NOTE — PLAN
[FreeTextEntry1] : Labs done prior to visit reviewed with Lawanda today. Glu and A1C are in IFG range and A1C is worse than last check (was 5.6, and is now 5.9%). Hct and MCV are elevated; iron studies and vit B12 levels are all wnl. Rest of labs wnl. Recommend she cont same meds/doses as she is currently taking, recommend Mediterranean style plant based eating and regular exercise.   Continue all medications as prescribed.   Reviewed age-appropriate preventive screening tests with patient. Routine gyn exam/pap, mammogram and CRC screening no longer medically indicated at this stage of life.  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 Compared to 2021 ECG SB is new but otherwise overall pattern of ECG looks similar. She denies any cardiac sxs at this time. She does not feel she needs/wishes to see card for f/u at this time though if she ever has any card sxs she will let  me know and would see card at that point   BP goal is <=135/85 on average on home checks. Advised to let us know if BPs are above goal on home checks.   Lifestyle measures to optimize BP reviewed, including regular exercise, adequate sleep, Mediterranean or DASH style clean eating, mindfulness/meditation, magnesium 100-400 mg supplementation, avoid NSAIDS, stress management techniques etc.  LDL goal <=100 ideally. Reviewed risks/benefits of statin med. Recommended excellent hydration +/- co Q10 (100-400 mg daily) to decrease risk for statin related myalgias.   Hydrate very well (64+ oz water per day), limit/avoid cardiac irritants (eg caffeine, alcohol, oral decongestants etc), stress reduction measures, consider magnesium supplementation (100-400 mg daily) to decrease palpitations.   Reviewed diagnosis of impaired fasting glucose (pre-diabetes) and risk for progression to diabetes. Reviewed dietary/lifestyle measures that can help to improve glucose and A1C levels over time and decrease risk for progression to diabetes, including eating cleanly (eg Mediterranean style eating plan), limiting/avoiding white flour carbohydrates and added sugars/sweeteners, pairing proteins with carbohydrates, higher fiber intake in diet, exercising regularly including cardio and strength training, achieving and maintaining a normal/near normal weight/BMI etc. We will monitor glucose and HgbA1C trends over time.  Discussed clean eating (eg Mediterranean style plant based eating plan) and regular exercise/staying as physically active as possible.  Include balance exercises and strength training and core strengthening exercises for bone health and to decrease risk for falls.  Recommended calcium 6817-3992 mg daily ideally mainly or fully from food sources +/- supplement if needed, vit D 3997-5510 IU or whatever dose is needed to get D into 30-80 range. Recommended regular weight bearing exercise as well as strength training exercise 3 or more times per week and balance exercises regularly.  Recommended Tylenol XS or Arthritis 1-2 pills BID-TID if helpful, She should avoid NSAIDs ideally given her age, other medical/health issues (and revd r/b/se of NSAIDs incl CV, renal and GI etc), regular stretching, heat/ice prn, consider turmeric supplementation, consider CBD cream or oral options, gentle yoga/chair yoga, Pilates, strengthen core muscles, consider chiro and/or massage and/or acupuncture. If these measures are not helpful enough then consider consultation with ortho and/or pain  for further eval and treatment.  Reviewed importance of good self care (e.g. meditation, yoga, adequate rest, regular exercise, magnesium, clean eating, etc.).  Revd r/b/se benzodiazepine meds and that these are not generally recommended for use in pts 65+ yrs of age due to increased risk for SE incl dizziness, incr risk for falls, cognitive effects including dementia. Since med is only occas/rarely used and is well tolerated and effective when needed ok to continue to keep on hand for now. If ever any SE pt will d/c med immediately.  Follow up with specialists as recommended by them.   Follow up for next physical in one year. Schedule RPA visit (chol/HTN etc) in about 6 months and rpt labs then.

## 2024-07-24 NOTE — PHYSICAL EXAM
[No Acute Distress] : no acute distress [Well Developed] : well developed [Well-Appearing] : well-appearing [Normal Sclera/Conjunctiva] : normal sclera/conjunctiva [EOMI] : extraocular movements intact [Normal Outer Ear/Nose] : the outer ears and nose were normal in appearance [Normal Oropharynx] : the oropharynx was normal [No JVD] : no jugular venous distention [No Lymphadenopathy] : no lymphadenopathy [Supple] : supple [Thyroid Normal, No Nodules] : the thyroid was normal and there were no nodules present [No Respiratory Distress] : no respiratory distress  [No Accessory Muscle Use] : no accessory muscle use [Clear to Auscultation] : lungs were clear to auscultation bilaterally [Regular Rhythm] : with a regular rhythm [Normal S1, S2] : normal S1 and S2 [No Carotid Bruits] : no carotid bruits [No Varicosities] : no varicosities [Pedal Pulses Present] : the pedal pulses are present [No Edema] : there was no peripheral edema [No Extremity Clubbing/Cyanosis] : no extremity clubbing/cyanosis [Soft] : abdomen soft [Non Tender] : non-tender [Non-distended] : non-distended [No Masses] : no abdominal mass palpated [No HSM] : no HSM [Normal Bowel Sounds] : normal bowel sounds [Normal Posterior Cervical Nodes] : no posterior cervical lymphadenopathy [Normal Anterior Cervical Nodes] : no anterior cervical lymphadenopathy [No Joint Swelling] : no joint swelling [Grossly Normal Strength/Tone] : grossly normal strength/tone [No Rash] : no rash [Coordination Grossly Intact] : coordination grossly intact [No Focal Deficits] : no focal deficits [Normal Gait] : normal gait [Normal Affect] : the affect was normal [Normal Insight/Judgement] : insight and judgment were intact [de-identified] : mildly obese BMI 33 though wgt stable since 11/2023 visit  [de-identified] : 1/6 soft systolic murmur heard along LSB, no ectopy heard [de-identified] : +sclerotic joints B hands/wrists/knees etc but no s/sxs acute synovitis [de-identified] : +signif UV damage noted to skin, chronic for her  and she is UTD on derm visits

## 2024-07-24 NOTE — PHYSICAL EXAM
[No Acute Distress] : no acute distress [Well Developed] : well developed [Well-Appearing] : well-appearing [Normal Sclera/Conjunctiva] : normal sclera/conjunctiva [EOMI] : extraocular movements intact [Normal Outer Ear/Nose] : the outer ears and nose were normal in appearance [Normal Oropharynx] : the oropharynx was normal [No JVD] : no jugular venous distention [No Lymphadenopathy] : no lymphadenopathy [Supple] : supple [Thyroid Normal, No Nodules] : the thyroid was normal and there were no nodules present [No Respiratory Distress] : no respiratory distress  [No Accessory Muscle Use] : no accessory muscle use [Clear to Auscultation] : lungs were clear to auscultation bilaterally [Regular Rhythm] : with a regular rhythm [Normal S1, S2] : normal S1 and S2 [No Carotid Bruits] : no carotid bruits [No Varicosities] : no varicosities [Pedal Pulses Present] : the pedal pulses are present [No Edema] : there was no peripheral edema [No Extremity Clubbing/Cyanosis] : no extremity clubbing/cyanosis [Soft] : abdomen soft [Non Tender] : non-tender [Non-distended] : non-distended [No Masses] : no abdominal mass palpated [No HSM] : no HSM [Normal Bowel Sounds] : normal bowel sounds [Normal Posterior Cervical Nodes] : no posterior cervical lymphadenopathy [Normal Anterior Cervical Nodes] : no anterior cervical lymphadenopathy [No Joint Swelling] : no joint swelling [Grossly Normal Strength/Tone] : grossly normal strength/tone [No Rash] : no rash [Coordination Grossly Intact] : coordination grossly intact [No Focal Deficits] : no focal deficits [Normal Gait] : normal gait [Normal Affect] : the affect was normal [Normal Insight/Judgement] : insight and judgment were intact [de-identified] : mildly obese BMI 33 though wgt stable since 11/2023 visit  [de-identified] : 1/6 soft systolic murmur heard along LSB, no ectopy heard [de-identified] : +sclerotic joints B hands/wrists/knees etc but no s/sxs acute synovitis [de-identified] : +signif UV damage noted to skin, chronic for her  and she is UTD on derm visits

## 2024-07-24 NOTE — PLAN
[FreeTextEntry1] : Labs done prior to visit reviewed with Lawanda today. Glu and A1C are in IFG range and A1C is worse than last check (was 5.6, and is now 5.9%). Hct and MCV are elevated; iron studies and vit B12 levels are all wnl. Rest of labs wnl. Recommend she cont same meds/doses as she is currently taking, recommend Mediterranean style plant based eating and regular exercise.   Continue all medications as prescribed.   Reviewed age-appropriate preventive screening tests with patient. Routine gyn exam/pap, mammogram and CRC screening no longer medically indicated at this stage of life.  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 Compared to 2021 ECG SB is new but otherwise overall pattern of ECG looks similar. She denies any cardiac sxs at this time. She does not feel she needs/wishes to see card for f/u at this time though if she ever has any card sxs she will let  me know and would see card at that point   BP goal is <=135/85 on average on home checks. Advised to let us know if BPs are above goal on home checks.   Lifestyle measures to optimize BP reviewed, including regular exercise, adequate sleep, Mediterranean or DASH style clean eating, mindfulness/meditation, magnesium 100-400 mg supplementation, avoid NSAIDS, stress management techniques etc.  LDL goal <=100 ideally. Reviewed risks/benefits of statin med. Recommended excellent hydration +/- co Q10 (100-400 mg daily) to decrease risk for statin related myalgias.   Hydrate very well (64+ oz water per day), limit/avoid cardiac irritants (eg caffeine, alcohol, oral decongestants etc), stress reduction measures, consider magnesium supplementation (100-400 mg daily) to decrease palpitations.   Reviewed diagnosis of impaired fasting glucose (pre-diabetes) and risk for progression to diabetes. Reviewed dietary/lifestyle measures that can help to improve glucose and A1C levels over time and decrease risk for progression to diabetes, including eating cleanly (eg Mediterranean style eating plan), limiting/avoiding white flour carbohydrates and added sugars/sweeteners, pairing proteins with carbohydrates, higher fiber intake in diet, exercising regularly including cardio and strength training, achieving and maintaining a normal/near normal weight/BMI etc. We will monitor glucose and HgbA1C trends over time.  Discussed clean eating (eg Mediterranean style plant based eating plan) and regular exercise/staying as physically active as possible.  Include balance exercises and strength training and core strengthening exercises for bone health and to decrease risk for falls.  Recommended calcium 2710-7223 mg daily ideally mainly or fully from food sources +/- supplement if needed, vit D 5124-8482 IU or whatever dose is needed to get D into 30-80 range. Recommended regular weight bearing exercise as well as strength training exercise 3 or more times per week and balance exercises regularly.  Recommended Tylenol XS or Arthritis 1-2 pills BID-TID if helpful, She should avoid NSAIDs ideally given her age, other medical/health issues (and revd r/b/se of NSAIDs incl CV, renal and GI etc), regular stretching, heat/ice prn, consider turmeric supplementation, consider CBD cream or oral options, gentle yoga/chair yoga, Pilates, strengthen core muscles, consider chiro and/or massage and/or acupuncture. If these measures are not helpful enough then consider consultation with ortho and/or pain  for further eval and treatment.  Reviewed importance of good self care (e.g. meditation, yoga, adequate rest, regular exercise, magnesium, clean eating, etc.).  Revd r/b/se benzodiazepine meds and that these are not generally recommended for use in pts 65+ yrs of age due to increased risk for SE incl dizziness, incr risk for falls, cognitive effects including dementia. Since med is only occas/rarely used and is well tolerated and effective when needed ok to continue to keep on hand for now. If ever any SE pt will d/c med immediately.  Follow up with specialists as recommended by them.   Follow up for next physical in one year. Schedule RPA visit (chol/HTN etc) in about 6 months and rpt labs then.

## 2024-09-14 ENCOUNTER — NON-APPOINTMENT (OUTPATIENT)
Age: 84
End: 2024-09-14

## 2024-09-21 ENCOUNTER — NON-APPOINTMENT (OUTPATIENT)
Age: 84
End: 2024-09-21

## 2024-12-06 ENCOUNTER — APPOINTMENT (OUTPATIENT)
Dept: DERMATOLOGY | Facility: CLINIC | Age: 84
End: 2024-12-06
Payer: MEDICARE

## 2024-12-06 DIAGNOSIS — C44.311 BASAL CELL CARCINOMA OF SKIN OF NOSE: ICD-10-CM

## 2024-12-06 DIAGNOSIS — L56.5 DISSEMINATED SUPERFICIAL ACTINIC POROKERATOSIS (DSAP): ICD-10-CM

## 2024-12-06 DIAGNOSIS — C44.310 BASAL CELL CARCINOMA OF SKIN OF UNSPECIFIED PARTS OF FACE: ICD-10-CM

## 2024-12-06 DIAGNOSIS — L81.4 OTHER MELANIN HYPERPIGMENTATION: ICD-10-CM

## 2024-12-06 DIAGNOSIS — L57.0 ACTINIC KERATOSIS: ICD-10-CM

## 2024-12-06 DIAGNOSIS — D48.9 NEOPLASM OF UNCERTAIN BEHAVIOR, UNSPECIFIED: ICD-10-CM

## 2024-12-06 PROCEDURE — 99213 OFFICE O/P EST LOW 20 MIN: CPT | Mod: 25

## 2024-12-06 PROCEDURE — 11102 TANGNTL BX SKIN SINGLE LES: CPT

## 2024-12-06 PROCEDURE — 17000 DESTRUCT PREMALG LESION: CPT | Mod: 59

## 2024-12-10 LAB — CORE LAB BIOPSY: NORMAL

## 2024-12-24 PROBLEM — F10.90 ALCOHOL USE: Status: ACTIVE | Noted: 2021-07-22

## 2025-01-10 ENCOUNTER — APPOINTMENT (OUTPATIENT)
Dept: FAMILY MEDICINE | Facility: CLINIC | Age: 85
End: 2025-01-10
Payer: MEDICARE

## 2025-01-10 PROCEDURE — 36415 COLL VENOUS BLD VENIPUNCTURE: CPT

## 2025-01-17 ENCOUNTER — APPOINTMENT (OUTPATIENT)
Dept: FAMILY MEDICINE | Facility: CLINIC | Age: 85
End: 2025-01-17
Payer: MEDICARE

## 2025-01-17 VITALS
SYSTOLIC BLOOD PRESSURE: 120 MMHG | OXYGEN SATURATION: 98 % | DIASTOLIC BLOOD PRESSURE: 78 MMHG | BODY MASS INDEX: 33.34 KG/M2 | HEART RATE: 59 BPM | WEIGHT: 220 LBS | TEMPERATURE: 97.4 F | HEIGHT: 68 IN

## 2025-01-17 DIAGNOSIS — Z85.828 PERSONAL HISTORY OF OTHER MALIGNANT NEOPLASM OF SKIN: ICD-10-CM

## 2025-01-17 DIAGNOSIS — M15.9 POLYOSTEOARTHRITIS, UNSPECIFIED: ICD-10-CM

## 2025-01-17 DIAGNOSIS — R73.01 IMPAIRED FASTING GLUCOSE: ICD-10-CM

## 2025-01-17 DIAGNOSIS — E78.00 PURE HYPERCHOLESTEROLEMIA, UNSPECIFIED: ICD-10-CM

## 2025-01-17 DIAGNOSIS — M85.80 OTHER SPECIFIED DISORDERS OF BONE DENSITY AND STRUCTURE, UNSPECIFIED SITE: ICD-10-CM

## 2025-01-17 DIAGNOSIS — Z87.898 PERSONAL HISTORY OF OTHER SPECIFIED CONDITIONS: ICD-10-CM

## 2025-01-17 DIAGNOSIS — F41.8 OTHER SPECIFIED ANXIETY DISORDERS: ICD-10-CM

## 2025-01-17 DIAGNOSIS — I10 ESSENTIAL (PRIMARY) HYPERTENSION: ICD-10-CM

## 2025-01-17 PROCEDURE — 99214 OFFICE O/P EST MOD 30 MIN: CPT

## 2025-01-17 RX ORDER — TIMOLOL MALEATE 5 MG/ML
0.5 SOLUTION OPHTHALMIC
Refills: 0 | Status: ACTIVE | COMMUNITY

## 2025-01-31 ENCOUNTER — APPOINTMENT (OUTPATIENT)
Dept: DERMATOLOGY | Facility: CLINIC | Age: 85
End: 2025-01-31
Payer: MEDICARE

## 2025-01-31 DIAGNOSIS — C44.310 BASAL CELL CARCINOMA OF SKIN OF UNSPECIFIED PARTS OF FACE: ICD-10-CM

## 2025-01-31 PROCEDURE — 17282 DSTR MAL LS F/E/E/N/L/M1.1-2: CPT

## 2025-05-16 ENCOUNTER — RX RENEWAL (OUTPATIENT)
Age: 85
End: 2025-05-16

## 2025-05-21 ENCOUNTER — NON-APPOINTMENT (OUTPATIENT)
Age: 85
End: 2025-05-21

## 2025-06-12 ENCOUNTER — APPOINTMENT (OUTPATIENT)
Dept: FAMILY MEDICINE | Facility: CLINIC | Age: 85
End: 2025-06-12

## 2025-06-25 ENCOUNTER — NON-APPOINTMENT (OUTPATIENT)
Age: 85
End: 2025-06-25

## 2025-07-11 ENCOUNTER — APPOINTMENT (OUTPATIENT)
Dept: FAMILY MEDICINE | Facility: CLINIC | Age: 85
End: 2025-07-11
Payer: MEDICARE

## 2025-07-11 VITALS
HEIGHT: 68 IN | BODY MASS INDEX: 32.28 KG/M2 | OXYGEN SATURATION: 98 % | TEMPERATURE: 97.4 F | WEIGHT: 213 LBS | HEART RATE: 92 BPM | SYSTOLIC BLOOD PRESSURE: 114 MMHG | DIASTOLIC BLOOD PRESSURE: 64 MMHG

## 2025-07-11 PROCEDURE — G2211 COMPLEX E/M VISIT ADD ON: CPT

## 2025-07-11 PROCEDURE — 99214 OFFICE O/P EST MOD 30 MIN: CPT

## 2025-07-11 RX ORDER — SERTRALINE HYDROCHLORIDE 50 MG/1
50 TABLET, FILM COATED ORAL
Qty: 90 | Refills: 3 | Status: ACTIVE | COMMUNITY
Start: 2025-07-11 | End: 1900-01-01

## 2025-08-05 ENCOUNTER — RX RENEWAL (OUTPATIENT)
Age: 85
End: 2025-08-05

## 2025-08-06 ENCOUNTER — APPOINTMENT (OUTPATIENT)
Dept: FAMILY MEDICINE | Facility: CLINIC | Age: 85
End: 2025-08-06

## 2025-08-07 ENCOUNTER — RESULT CHARGE (OUTPATIENT)
Age: 85
End: 2025-08-07

## 2025-08-08 LAB — VIT B12 SERPL-MCNC: 457 PG/ML

## 2025-08-12 PROBLEM — F41.9 ANXIETY: Status: ACTIVE | Noted: 2025-07-10

## 2025-08-13 ENCOUNTER — APPOINTMENT (OUTPATIENT)
Dept: FAMILY MEDICINE | Facility: CLINIC | Age: 85
End: 2025-08-13
Payer: MEDICARE

## 2025-08-13 VITALS
WEIGHT: 211 LBS | HEART RATE: 64 BPM | DIASTOLIC BLOOD PRESSURE: 66 MMHG | BODY MASS INDEX: 31.98 KG/M2 | TEMPERATURE: 97.1 F | OXYGEN SATURATION: 97 % | SYSTOLIC BLOOD PRESSURE: 116 MMHG | HEIGHT: 68 IN

## 2025-08-13 DIAGNOSIS — I10 ESSENTIAL (PRIMARY) HYPERTENSION: ICD-10-CM

## 2025-08-13 DIAGNOSIS — R00.1 BRADYCARDIA, UNSPECIFIED: ICD-10-CM

## 2025-08-13 DIAGNOSIS — M85.80 OTHER SPECIFIED DISORDERS OF BONE DENSITY AND STRUCTURE, UNSPECIFIED SITE: ICD-10-CM

## 2025-08-13 DIAGNOSIS — F41.9 ANXIETY DISORDER, UNSPECIFIED: ICD-10-CM

## 2025-08-13 DIAGNOSIS — Z00.00 ENCOUNTER FOR GENERAL ADULT MEDICAL EXAMINATION W/OUT ABNORMAL FINDINGS: ICD-10-CM

## 2025-08-13 DIAGNOSIS — E78.00 PURE HYPERCHOLESTEROLEMIA, UNSPECIFIED: ICD-10-CM

## 2025-08-13 PROCEDURE — G0439: CPT

## 2025-08-13 PROCEDURE — 93000 ELECTROCARDIOGRAM COMPLETE: CPT
